# Patient Record
Sex: FEMALE | Race: WHITE | NOT HISPANIC OR LATINO | Employment: UNEMPLOYED | URBAN - METROPOLITAN AREA
[De-identification: names, ages, dates, MRNs, and addresses within clinical notes are randomized per-mention and may not be internally consistent; named-entity substitution may affect disease eponyms.]

---

## 2017-01-31 ENCOUNTER — OFFICE VISIT (OUTPATIENT)
Dept: INTERNAL MEDICINE | Facility: CLINIC | Age: 57
End: 2017-01-31

## 2017-01-31 VITALS
SYSTOLIC BLOOD PRESSURE: 120 MMHG | WEIGHT: 164 LBS | HEIGHT: 69 IN | DIASTOLIC BLOOD PRESSURE: 72 MMHG | BODY MASS INDEX: 24.29 KG/M2 | HEART RATE: 86 BPM | OXYGEN SATURATION: 100 %

## 2017-01-31 DIAGNOSIS — Z01.419 PAP SMEAR, AS PART OF ROUTINE GYNECOLOGICAL EXAMINATION: ICD-10-CM

## 2017-01-31 DIAGNOSIS — Z00.00 ROUTINE ADULT HEALTH MAINTENANCE: Primary | ICD-10-CM

## 2017-01-31 DIAGNOSIS — Z12.11 SCREENING FOR COLON CANCER: ICD-10-CM

## 2017-01-31 DIAGNOSIS — Z12.39 SCREENING FOR BREAST CANCER: ICD-10-CM

## 2017-01-31 LAB
ALBUMIN SERPL-MCNC: 4.7 G/DL (ref 3.2–4.8)
ALBUMIN/GLOB SERPL: 1.6 G/DL (ref 1.5–2.5)
ALP SERPL-CCNC: 45 U/L (ref 25–100)
ALT SERPL W P-5'-P-CCNC: 27 U/L (ref 7–40)
ANION GAP SERPL CALCULATED.3IONS-SCNC: 8 MMOL/L (ref 3–11)
ARTICHOKE IGE QN: 199 MG/DL (ref 0–130)
AST SERPL-CCNC: 29 U/L (ref 0–33)
BASOPHILS # BLD AUTO: 0.07 10*3/MM3 (ref 0–0.2)
BASOPHILS NFR BLD AUTO: 1.1 % (ref 0–1)
BILIRUB BLD-MCNC: NEGATIVE MG/DL
BILIRUB SERPL-MCNC: 0.7 MG/DL (ref 0.3–1.2)
BUN BLD-MCNC: 11 MG/DL (ref 9–23)
BUN/CREAT SERPL: 15.7 (ref 7–25)
CALCIUM SPEC-SCNC: 10 MG/DL (ref 8.7–10.4)
CHLORIDE SERPL-SCNC: 97 MMOL/L (ref 99–109)
CHOLEST SERPL-MCNC: 304 MG/DL (ref 0–200)
CLARITY, POC: CLEAR
CO2 SERPL-SCNC: 32 MMOL/L (ref 20–31)
COLOR UR: YELLOW
CREAT BLD-MCNC: 0.7 MG/DL (ref 0.6–1.3)
DEPRECATED RDW RBC AUTO: 40.2 FL (ref 37–54)
EOSINOPHIL # BLD AUTO: 0.05 10*3/MM3 (ref 0.1–0.3)
EOSINOPHIL NFR BLD AUTO: 0.8 % (ref 0–3)
ERYTHROCYTE [DISTWIDTH] IN BLOOD BY AUTOMATED COUNT: 12.9 % (ref 11.3–14.5)
GFR SERPL CREATININE-BSD FRML MDRD: 87 ML/MIN/1.73
GLOBULIN UR ELPH-MCNC: 3 GM/DL
GLUCOSE BLD-MCNC: 102 MG/DL (ref 70–100)
GLUCOSE UR STRIP-MCNC: NEGATIVE MG/DL
HCT VFR BLD AUTO: 43.7 % (ref 34.5–44)
HDLC SERPL-MCNC: 63 MG/DL (ref 40–60)
HGB BLD-MCNC: 14.7 G/DL (ref 11.5–15.5)
IMM GRANULOCYTES # BLD: 0.02 10*3/MM3 (ref 0–0.03)
IMM GRANULOCYTES NFR BLD: 0.3 % (ref 0–0.6)
KETONES UR QL: NEGATIVE
LEUKOCYTE EST, POC: NEGATIVE
LYMPHOCYTES # BLD AUTO: 1.81 10*3/MM3 (ref 0.6–4.8)
LYMPHOCYTES NFR BLD AUTO: 27.7 % (ref 24–44)
MCH RBC QN AUTO: 28.7 PG (ref 27–31)
MCHC RBC AUTO-ENTMCNC: 33.6 G/DL (ref 32–36)
MCV RBC AUTO: 85.2 FL (ref 80–99)
MONOCYTES # BLD AUTO: 0.57 10*3/MM3 (ref 0–1)
MONOCYTES NFR BLD AUTO: 8.7 % (ref 0–12)
NEUTROPHILS # BLD AUTO: 4.01 10*3/MM3 (ref 1.5–8.3)
NEUTROPHILS NFR BLD AUTO: 61.4 % (ref 41–71)
NITRITE UR-MCNC: NEGATIVE MG/ML
PH UR: 7 [PH] (ref 5–8)
PLATELET # BLD AUTO: 275 10*3/MM3 (ref 150–450)
PMV BLD AUTO: 10.7 FL (ref 6–12)
POTASSIUM BLD-SCNC: 4.1 MMOL/L (ref 3.5–5.5)
PROT SERPL-MCNC: 7.7 G/DL (ref 5.7–8.2)
PROT UR STRIP-MCNC: NEGATIVE MG/DL
RBC # BLD AUTO: 5.13 10*6/MM3 (ref 3.89–5.14)
RBC # UR STRIP: NEGATIVE /UL
SODIUM BLD-SCNC: 137 MMOL/L (ref 132–146)
SP GR UR: 1.01 (ref 1–1.03)
TRIGL SERPL-MCNC: 163 MG/DL (ref 0–150)
TSH SERPL DL<=0.05 MIU/L-ACNC: 1.18 MIU/ML (ref 0.35–5.35)
UROBILINOGEN UR QL: NORMAL
WBC NRBC COR # BLD: 6.53 10*3/MM3 (ref 3.5–10.8)

## 2017-01-31 PROCEDURE — 80050 GENERAL HEALTH PANEL: CPT | Performed by: NURSE PRACTITIONER

## 2017-01-31 PROCEDURE — 81003 URINALYSIS AUTO W/O SCOPE: CPT | Performed by: NURSE PRACTITIONER

## 2017-01-31 PROCEDURE — 99386 PREV VISIT NEW AGE 40-64: CPT | Performed by: NURSE PRACTITIONER

## 2017-01-31 PROCEDURE — 80061 LIPID PANEL: CPT | Performed by: NURSE PRACTITIONER

## 2017-01-31 RX ORDER — TRETINOIN 1 MG/G
CREAM TOPICAL NIGHTLY
COMMUNITY

## 2017-01-31 NOTE — PROGRESS NOTES
Subjective  Establish Care (needing new PCP, needs referral for mammogram )      Ana Hart is a 56 y.o. female.   No Known Allergies  History of Present Illness      Went to eye doctor last week , asked her if smoked or what htn by looking at her arteries    Last pap was 3 yrs ago, mammogram 2 years ago   Never had colonoscopy  Never lipids checked   Would like cpe/pap  The following portions of the patient's history were reviewed and updated as appropriate: allergies, current medications, past family history, past medical history, past social history, past surgical history and problem list.    Review of Systems   Constitutional: Negative for appetite change, fever and unexpected weight change.   HENT: Negative for ear pain, facial swelling and sore throat.    Eyes: Negative for pain and visual disturbance.   Respiratory: Negative for chest tightness, shortness of breath and wheezing.    Cardiovascular: Negative for chest pain and palpitations.   Gastrointestinal: Negative for abdominal pain and blood in stool.   Endocrine: Negative.    Genitourinary: Negative for difficulty urinating and hematuria.   Musculoskeletal: Negative for joint swelling.   Neurological: Negative for tremors, seizures and syncope.   Hematological: Negative for adenopathy.   Psychiatric/Behavioral: Negative.        Objective   Physical Exam   Constitutional: She is oriented to person, place, and time. She appears well-developed and well-nourished. No distress.   HENT:   Head: Normocephalic and atraumatic. Hair is normal.   Right Ear: Hearing, tympanic membrane, external ear and ear canal normal. No drainage. No decreased hearing is noted.   Left Ear: Hearing, tympanic membrane, external ear and ear canal normal. No decreased hearing is noted.   Nose: No nasal deformity.   Mouth/Throat: Oropharynx is clear and moist.   Eyes: Conjunctivae, EOM and lids are normal. Pupils are equal, round, and reactive to light. Lids are everted and swept, no  "foreign bodies found. Right eye exhibits no discharge. Left eye exhibits no discharge.   Fundoscopic exam:       The right eye shows red reflex.        The left eye shows red reflex.   Neck: Normal range of motion. Neck supple. No JVD present. No tracheal deviation present. No thyromegaly present.   Cardiovascular: Normal rate, regular rhythm, normal heart sounds, intact distal pulses and normal pulses.  Exam reveals no gallop and no friction rub.    No murmur heard.  Pulmonary/Chest: Effort normal and breath sounds normal. No respiratory distress. She has no wheezes. She has no rales. She exhibits no tenderness.   Abdominal: Soft. Bowel sounds are normal. She exhibits no distension and no mass. There is no tenderness. There is no rebound and no guarding. No hernia.   Genitourinary: Vagina normal and uterus normal. Rectal exam shows guaiac negative stool. No vaginal discharge found.   Musculoskeletal: Normal range of motion. She exhibits no edema, tenderness or deformity.   Lymphadenopathy:     She has no cervical adenopathy.        Right: No inguinal adenopathy present.        Left: No inguinal adenopathy present.   Neurological: She is alert and oriented to person, place, and time. She has normal reflexes. She displays normal reflexes. No cranial nerve deficit. She exhibits normal muscle tone. Coordination normal.   Skin: Skin is warm and dry. No rash noted. She is not diaphoretic. No erythema.   Psychiatric: She has a normal mood and affect. Her behavior is normal. Judgment and thought content normal.   Nursing note and vitals reviewed.    Visit Vitals   • /72   • Pulse 86   • Ht 69\" (175.3 cm)   • Wt 164 lb (74.4 kg)   • SpO2 100%   • BMI 24.22 kg/m2     , has lived with smokers all her life   Assessment/Plan     Problem List Items Addressed This Visit        Other    Routine adult health maintenance - Primary     Flu shot- does not get those   Never had colonoscopy   SBE- monthly          Relevant Orders "    CBC & AUTO Differential    Comprehensive Metabolic Panel    TSH    Lipid Panel    Ambulatory Referral to Dermatology    POC Urinalysis Dipstick, Automated (Completed)    CBC Auto Differential      Other Visit Diagnoses     Screening for breast cancer        Relevant Orders    Mammo Screening Digital Tomosynthesis Bilateral With CAD    Screening for colon cancer        Relevant Orders    Ambulatory Referral For Screening Colonoscopy    Pap smear, as part of routine gynecological examination            Seatbelts:-yes  Sunscreenyes  Smoke detectors;yes  Self skin exam;yes  Home secure;yes  Healthy diet;yes  Exercise;yes  Smoker;no    Results for orders placed or performed in visit on 01/31/17   POC Urinalysis Dipstick, Automated   Result Value Ref Range    Color Yellow Yellow, Straw, Dark Yellow, Alejandra    Clarity, UA Clear Clear    Glucose, UA Negative Negative, 1000 mg/dL (3+) mg/dL    Bilirubin Negative Negative    Ketones, UA Negative Negative    Specific Gravity  1.010 1.005 - 1.030    Blood, UA Negative Negative    pH, Urine 7.0 5.0 - 8.0    Protein, POC Negative Negative mg/dL    Urobilinogen, UA Normal Normal    Leukocytes Negative Negative    Nitrite, UA Negative Negative     I will review labs and send to pt with results and poc

## 2017-02-01 ENCOUNTER — TELEPHONE (OUTPATIENT)
Dept: INTERNAL MEDICINE | Facility: CLINIC | Age: 57
End: 2017-02-01

## 2017-02-01 RX ORDER — ATORVASTATIN CALCIUM 20 MG/1
20 TABLET, FILM COATED ORAL DAILY
Qty: 30 TABLET | Refills: 11 | Status: SHIPPED | OUTPATIENT
Start: 2017-02-01 | End: 2019-01-03

## 2017-02-01 NOTE — TELEPHONE ENCOUNTER
----- Message from Monica Hernandez sent at 2/1/2017 10:26 AM EST -----  Contact: PATIENT   RE: NEW RX    PATIENT RECEIVED CALL FROM HER PHARMACY TO TELL SHE HAD A PRESCRIPTION READY TO BE PICKED AND SHE IS UNAWARE OF ANY NEW MEDICATION. SHE WOULD LIKE TO SPEAK WITH YOU ABOUT THIS.    [facility] [phone]  [place] [phone]

## 2017-02-01 NOTE — TELEPHONE ENCOUNTER
Called and informed pt that Milly had sent in some cholesterol medication due to her levels coming back high and informed pt of Milly's recommendations. Pt verbalized understanding and had no further questions.

## 2017-02-17 ENCOUNTER — HOSPITAL ENCOUNTER (OUTPATIENT)
Dept: MAMMOGRAPHY | Facility: HOSPITAL | Age: 57
Discharge: HOME | End: 2017-02-17
Admitting: NURSE PRACTITIONER

## 2017-02-17 DIAGNOSIS — Z12.39 SCREENING FOR BREAST CANCER: ICD-10-CM

## 2017-02-17 PROCEDURE — 77063 BREAST TOMOSYNTHESIS BI: CPT | Performed by: RADIOLOGY

## 2017-02-17 PROCEDURE — 77067 SCR MAMMO BI INCL CAD: CPT | Performed by: RADIOLOGY

## 2017-02-17 PROCEDURE — G0202 SCR MAMMO BI INCL CAD: HCPCS

## 2017-02-17 PROCEDURE — 77063 BREAST TOMOSYNTHESIS BI: CPT

## 2017-05-04 DIAGNOSIS — E78.5 DYSLIPIDEMIA: Primary | ICD-10-CM

## 2017-05-04 LAB
ARTICHOKE IGE QN: 64 MG/DL (ref 0–130)
CHOLEST SERPL-MCNC: 128 MG/DL (ref 0–200)
HDLC SERPL-MCNC: 54 MG/DL (ref 40–60)
TRIGL SERPL-MCNC: 61 MG/DL (ref 0–150)

## 2017-05-04 PROCEDURE — 80061 LIPID PANEL: CPT | Performed by: NURSE PRACTITIONER

## 2019-01-03 ENCOUNTER — RESULTS ENCOUNTER (OUTPATIENT)
Dept: INTERNAL MEDICINE | Facility: CLINIC | Age: 59
End: 2019-01-03

## 2019-01-03 ENCOUNTER — OFFICE VISIT (OUTPATIENT)
Dept: INTERNAL MEDICINE | Facility: CLINIC | Age: 59
End: 2019-01-03

## 2019-01-03 VITALS
WEIGHT: 165.6 LBS | SYSTOLIC BLOOD PRESSURE: 104 MMHG | HEIGHT: 69 IN | TEMPERATURE: 98.2 F | OXYGEN SATURATION: 98 % | DIASTOLIC BLOOD PRESSURE: 66 MMHG | HEART RATE: 94 BPM | BODY MASS INDEX: 24.53 KG/M2

## 2019-01-03 DIAGNOSIS — Z12.4 SCREENING FOR MALIGNANT NEOPLASM OF CERVIX: ICD-10-CM

## 2019-01-03 DIAGNOSIS — Z12.39 SCREENING FOR MALIGNANT NEOPLASM OF BREAST: ICD-10-CM

## 2019-01-03 DIAGNOSIS — Z11.59 ENCOUNTER FOR HEPATITIS C SCREENING TEST FOR LOW RISK PATIENT: ICD-10-CM

## 2019-01-03 DIAGNOSIS — Z00.00 HEALTHCARE MAINTENANCE: ICD-10-CM

## 2019-01-03 DIAGNOSIS — Z12.11 SCREENING FOR MALIGNANT NEOPLASM OF COLON: Primary | ICD-10-CM

## 2019-01-03 DIAGNOSIS — Z12.11 SCREENING FOR MALIGNANT NEOPLASM OF COLON: ICD-10-CM

## 2019-01-03 LAB
ALBUMIN SERPL-MCNC: 4.52 G/DL (ref 3.2–4.8)
ALBUMIN/GLOB SERPL: 2.1 G/DL (ref 1.5–2.5)
ALP SERPL-CCNC: 49 U/L (ref 25–100)
ALT SERPL W P-5'-P-CCNC: 28 U/L (ref 7–40)
ANION GAP SERPL CALCULATED.3IONS-SCNC: 6 MMOL/L (ref 3–11)
ARTICHOKE IGE QN: 210 MG/DL (ref 0–130)
AST SERPL-CCNC: 25 U/L (ref 0–33)
BILIRUB SERPL-MCNC: 0.8 MG/DL (ref 0.3–1.2)
BUN BLD-MCNC: 12 MG/DL (ref 9–23)
BUN/CREAT SERPL: 15.2 (ref 7–25)
CALCIUM SPEC-SCNC: 9.3 MG/DL (ref 8.7–10.4)
CHLORIDE SERPL-SCNC: 100 MMOL/L (ref 99–109)
CHOLEST SERPL-MCNC: 271 MG/DL (ref 0–200)
CO2 SERPL-SCNC: 32 MMOL/L (ref 20–31)
CREAT BLD-MCNC: 0.79 MG/DL (ref 0.6–1.3)
GFR SERPL CREATININE-BSD FRML MDRD: 75 ML/MIN/1.73
GLOBULIN UR ELPH-MCNC: 2.2 GM/DL
GLUCOSE BLD-MCNC: 94 MG/DL (ref 70–100)
HCV AB SER DONR QL: NORMAL
HDLC SERPL-MCNC: 59 MG/DL (ref 40–60)
POTASSIUM BLD-SCNC: 4.1 MMOL/L (ref 3.5–5.5)
PROT SERPL-MCNC: 6.7 G/DL (ref 5.7–8.2)
SODIUM BLD-SCNC: 138 MMOL/L (ref 132–146)
TRIGL SERPL-MCNC: 125 MG/DL (ref 0–150)

## 2019-01-03 PROCEDURE — 90471 IMMUNIZATION ADMIN: CPT | Performed by: NURSE PRACTITIONER

## 2019-01-03 PROCEDURE — 86803 HEPATITIS C AB TEST: CPT | Performed by: NURSE PRACTITIONER

## 2019-01-03 PROCEDURE — 90674 CCIIV4 VAC NO PRSV 0.5 ML IM: CPT | Performed by: NURSE PRACTITIONER

## 2019-01-03 PROCEDURE — 99396 PREV VISIT EST AGE 40-64: CPT | Performed by: NURSE PRACTITIONER

## 2019-01-03 PROCEDURE — 80053 COMPREHEN METABOLIC PANEL: CPT | Performed by: NURSE PRACTITIONER

## 2019-01-03 PROCEDURE — 80061 LIPID PANEL: CPT | Performed by: NURSE PRACTITIONER

## 2019-01-03 RX ORDER — ATORVASTATIN CALCIUM 40 MG/1
40 TABLET, FILM COATED ORAL DAILY
Qty: 30 TABLET | Refills: 1 | Status: SHIPPED | OUTPATIENT
Start: 2019-01-03 | End: 2019-02-15 | Stop reason: SDUPTHER

## 2019-01-03 NOTE — PROGRESS NOTES
"Sebas Hart is a 58 y.o. female.   Chief Complaint   Patient presents with   • Annual Exam      History of Present Illness Needs pap  No periods.  No hysterectomy.  States she feels well.Patient denies fever chills, headache, ear pain, sore throat, shortness of air, cough, chest pain, abdominal pain, nausea, vomiting, diarrhea, dysuria, blood in stool or urine. Mood is good.  PHQ-2 Depression Screen negative  Eating and drinking as usual.  No unexplained weight loss or gain.    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    Current Outpatient Medications:   •  Multiple Vitamins-Minerals (CENTRUM SILVER ULTRA WOMENS PO), Take  by mouth., Disp: , Rfl:   •  tretinoin (RETIN-A) 0.1 % cream, Apply  topically Every Night., Disp: , Rfl:     Review of Systems Consitutional, HEENT, Respiratory, CV, GI, , Skin, Musculoskeletal, Neuro-mental, Endocrinological, Hematological were reviewed.  Positives were discussed in the HPI, otherwise ROS was negative   /66   Pulse 94   Temp 98.2 °F (36.8 °C)   Ht 175.3 cm (69\")   Wt 75.1 kg (165 lb 9.6 oz)   SpO2 98%   BMI 24.45 kg/m²     Objective   No Known Allergies    Physical Exam   Constitutional: She is oriented to person, place, and time. She appears well-developed and well-nourished. No distress.   HENT:   Head: Normocephalic.   Right Ear: External ear normal.   Left Ear: External ear normal.   Mouth/Throat: Oropharynx is clear and moist.   TMs are clear   Eyes: Pupils are equal, round, and reactive to light. Right eye exhibits no discharge. Left eye exhibits no discharge. No scleral icterus.   Neck: Neck supple. No thyromegaly present.   No carotid bruit bilat   Cardiovascular: Normal rate, regular " rhythm, normal heart sounds and intact distal pulses. Exam reveals no gallop and no friction rub.   No murmur heard.  Pulmonary/Chest: Effort normal and breath sounds normal. No stridor. No respiratory distress. She has no wheezes. She has no rales.   Rest normal female, no mass dimpling or discharge or nodes   Abdominal: Soft. Bowel sounds are normal. She exhibits no distension and no mass. There is no tenderness. There is no rebound and no guarding.   Genitourinary: Vagina normal and uterus normal.   Genitourinary Comments: Uterus, adnexa, ovaries nontender nonenlarged   Musculoskeletal:   MOTA well.  Gait upright and steady    Lymphadenopathy:     She has no cervical adenopathy.   Neurological: She is alert and oriented to person, place, and time.   Skin: Skin is warm and dry. Capillary refill takes less than 2 seconds.   Is pink, no rash    Psychiatric: She has a normal mood and affect. Her behavior is normal. Judgment and thought content normal.   Nursing note and vitals reviewed.      Procedures    LABS  Results for orders placed or performed in visit on 01/03/19   Comprehensive Metabolic Panel   Result Value Ref Range    Glucose 94 70 - 100 mg/dL    BUN 12 9 - 23 mg/dL    Creatinine 0.79 0.60 - 1.30 mg/dL    Sodium 138 132 - 146 mmol/L    Potassium 4.1 3.5 - 5.5 mmol/L    Chloride 100 99 - 109 mmol/L    CO2 32.0 (H) 20.0 - 31.0 mmol/L    Calcium 9.3 8.7 - 10.4 mg/dL    Total Protein 6.7 5.7 - 8.2 g/dL    Albumin 4.52 3.20 - 4.80 g/dL    ALT (SGPT) 28 7 - 40 U/L    AST (SGOT) 25 0 - 33 U/L    Alkaline Phosphatase 49 25 - 100 U/L    Total Bilirubin 0.8 0.3 - 1.2 mg/dL    eGFR Non African Amer 75 >60 mL/min/1.73    Globulin 2.2 gm/dL    A/G Ratio 2.1 1.5 - 2.5 g/dL    BUN/Creatinine Ratio 15.2 7.0 - 25.0    Anion Gap 6.0 3.0 - 11.0 mmol/L   Lipid Panel   Result Value Ref Range    Total Cholesterol 271 (H) 0 - 200 mg/dL    Triglycerides 125 0 - 150 mg/dL    HDL Cholesterol 59 40 - 60 mg/dL    LDL Cholesterol  210  "(H) 0 - 130 mg/dL   Hepatitis C antibody   Result Value Ref Range    Hepatitis C Ab Non-Reactive Non-Reactive       Assessment/Plan   Ana was seen today for annual exam.    Diagnoses and all orders for this visit:    Screening for malignant neoplasm of colon  -     Cologuard - Stool, Per Rectum; Future    Screening for malignant neoplasm of breast  -     Mammo screening digital tomosynthesis bilateral w CAD; Future    Healthcare maintenance  -     Comprehensive Metabolic Panel  -     Lipid Panel    Encounter for hepatitis C screening test for low risk patient  -     Hepatitis C antibody    Screening for malignant neoplasm of cervix  -     Liquid-based Pap Smear, Screening; Future    Other orders  -     Flucelvax Quad=>4Years ([id number])      Patient Instructions   Labs as discussed.  Mammogram as discussed.  [place] guard screening as discussed.  Health Education:  Heart healthy diet to include fresh fruits and vegetables.  Limit intake of red meats.  Increase chicken and fish in diet.  Avoid fried greasy foods.  Daily activity working up 30 minutes of brisk exercise daily.  Adequate sleep and \"down time\".  Pt verbalizes understanding and agreement with plan of care.       EMR Dragon/transcription disclaimer:  Please note that portions of this note were completed with a voice recognition program.  Electronic transcription of the voice recognition program may permit erroneous words or phrases to be inadvertently transcribed.  Although I have reviewed the note for such errors, some may still exist in this documentation   JUAN Penn   "

## 2019-01-03 NOTE — PATIENT INSTRUCTIONS
"Labs as discussed.  Mammogram as discussed.  [place] guard screening as discussed.  Health Education:  Heart healthy diet to include fresh fruits and vegetables.  Limit intake of red meats.  Increase chicken and fish in diet.  Avoid fried greasy foods.  Daily activity working up 30 minutes of brisk exercise daily.  Adequate sleep and \"down time\".  Pt verbalizes understanding and agreement with plan of care.   "

## 2019-01-09 ENCOUNTER — TELEPHONE (OUTPATIENT)
Dept: INTERNAL MEDICINE | Facility: CLINIC | Age: 59
End: 2019-01-09

## 2019-02-15 ENCOUNTER — OFFICE VISIT (OUTPATIENT)
Dept: INTERNAL MEDICINE | Facility: CLINIC | Age: 59
End: 2019-02-15

## 2019-02-15 ENCOUNTER — HOSPITAL ENCOUNTER (OUTPATIENT)
Dept: MAMMOGRAPHY | Facility: HOSPITAL | Age: 59
Discharge: HOME | End: 2019-02-15
Admitting: NURSE PRACTITIONER

## 2019-02-15 VITALS
BODY MASS INDEX: 23.99 KG/M2 | WEIGHT: 162 LBS | HEART RATE: 79 BPM | SYSTOLIC BLOOD PRESSURE: 112 MMHG | DIASTOLIC BLOOD PRESSURE: 60 MMHG | OXYGEN SATURATION: 99 % | HEIGHT: 69 IN

## 2019-02-15 DIAGNOSIS — Z12.39 SCREENING FOR MALIGNANT NEOPLASM OF BREAST: ICD-10-CM

## 2019-02-15 DIAGNOSIS — E78.5 HYPERLIPIDEMIA, UNSPECIFIED HYPERLIPIDEMIA TYPE: Primary | ICD-10-CM

## 2019-02-15 LAB
ALBUMIN SERPL-MCNC: 4.63 G/DL (ref 3.2–4.8)
ALBUMIN/GLOB SERPL: 2.4 G/DL (ref 1.5–2.5)
ALP SERPL-CCNC: 44 U/L (ref 25–100)
ALT SERPL W P-5'-P-CCNC: 32 U/L (ref 7–40)
ANION GAP SERPL CALCULATED.3IONS-SCNC: 4 MMOL/L (ref 3–11)
AST SERPL-CCNC: 28 U/L (ref 0–33)
BILIRUB SERPL-MCNC: 0.8 MG/DL (ref 0.3–1.2)
BUN BLD-MCNC: 12 MG/DL (ref 9–23)
BUN/CREAT SERPL: 16.2 (ref 7–25)
CALCIUM SPEC-SCNC: 9.8 MG/DL (ref 8.7–10.4)
CHLORIDE SERPL-SCNC: 105 MMOL/L (ref 99–109)
CO2 SERPL-SCNC: 32 MMOL/L (ref 20–31)
CREAT BLD-MCNC: 0.74 MG/DL (ref 0.6–1.3)
GFR SERPL CREATININE-BSD FRML MDRD: 81 ML/MIN/1.73
GLOBULIN UR ELPH-MCNC: 2 GM/DL
GLUCOSE BLD-MCNC: 91 MG/DL (ref 70–100)
POTASSIUM BLD-SCNC: 4.3 MMOL/L (ref 3.5–5.5)
PROT SERPL-MCNC: 6.6 G/DL (ref 5.7–8.2)
SODIUM BLD-SCNC: 141 MMOL/L (ref 132–146)

## 2019-02-15 PROCEDURE — 80053 COMPREHEN METABOLIC PANEL: CPT | Performed by: NURSE PRACTITIONER

## 2019-02-15 PROCEDURE — 99213 OFFICE O/P EST LOW 20 MIN: CPT | Performed by: NURSE PRACTITIONER

## 2019-02-15 PROCEDURE — 77067 SCR MAMMO BI INCL CAD: CPT

## 2019-02-15 PROCEDURE — 77063 BREAST TOMOSYNTHESIS BI: CPT

## 2019-02-15 PROCEDURE — 77067 SCR MAMMO BI INCL CAD: CPT | Performed by: RADIOLOGY

## 2019-02-15 PROCEDURE — 77063 BREAST TOMOSYNTHESIS BI: CPT | Performed by: RADIOLOGY

## 2019-02-15 RX ORDER — ATORVASTATIN CALCIUM 40 MG/1
40 TABLET, FILM COATED ORAL DAILY
Qty: 90 TABLET | Refills: 1 | Status: SHIPPED | OUTPATIENT
Start: 2019-02-15 | End: 2019-07-15 | Stop reason: SDUPTHER

## 2019-02-15 NOTE — PATIENT INSTRUCTIONS
Labs as discussed.  Stay on a heart healthy low-fat low-cholesterol diet.  Exercises daily.  Begin Benefiber and omega-3 Fish oil in addition to the Lipitor.  We will repeat your lipid panel in 6 months.  Pt verbalizes understanding and agreement with plan of care.

## 2019-02-15 NOTE — PROGRESS NOTES
"Sebas Hart is a 58 y.o. female.   Chief Complaint   Patient presents with   • Hyperlipidemia      History of Present Illness  As above  Is tolerating Lipitor without problems.  Has Made dietary modification.  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  Patient denies fever chills, headache, ear pain, sore throat, shortness of air, cough, wheezing,  chest pain, abdominal pain, nausea, vomiting, diarrhea, dysuria, blood in stool or urine.  Mood is good.  Eating and drinking as usual.  No unexplained weight loss or gain.       The following portions of the patient's history were reviewed and updated as appropriate: allergies, current medications, past family history, past medical history, past social history, past surgical history and problem list.    Current Outpatient Medications:   •  atorvastatin (LIPITOR) 40 MG tablet, Take 1 tablet by mouth Daily., Disp: 90 tablet, Rfl: 1  •  Multiple Vitamins-Minerals (CENTRUM SILVER ULTRA WOMENS PO), Take  by mouth., Disp: , Rfl:   •  tretinoin (RETIN-A) 0.1 % cream, Apply  topically Every Night., Disp: , Rfl:     Review of Systems Consitutional, HEENT, Respiratory, CV, GI, , Skin, Musculoskeletal, Neuro-mental, Endocrinological, Hematological were reviewed.  Positives were discussed in the HPI, otherwise ROS was negative   /60   Pulse 79   Ht 175.3 cm (69\")   Wt 73.5 kg (162 lb)   SpO2 99%   BMI 23.92 kg/m²     Objective   No Known Allergies    Physical Exam   Constitutional: She is oriented to person, place, and time. She appears well-developed and well-nourished. No distress.   HENT:   Head: Normocephalic.   Eyes: Right eye exhibits no discharge. Left eye exhibits no discharge. No scleral icterus.   Neck: Neck supple.   Cardiovascular: Normal rate, regular rhythm, normal heart sounds and intact distal pulses. Exam reveals no gallop and no friction rub.   No murmur heard.  Pulmonary/Chest: Effort normal and breath sounds normal. No " stridor. No respiratory distress. She has no wheezes. She has no rales.   Abdominal: Soft. She exhibits no mass. There is no tenderness.   Musculoskeletal:   MOTA well.  Gait upright and steady    Neurological: She is alert and oriented to person, place, and time.   Skin: Skin is warm and dry. Capillary refill takes less than 2 seconds.   Is pink, no rash    Nursing note and vitals reviewed.      Procedures    LABS  Results for orders placed or performed in visit on 01/03/19   Comprehensive Metabolic Panel   Result Value Ref Range    Glucose 94 70 - 100 mg/dL    BUN 12 9 - 23 mg/dL    Creatinine 0.79 0.60 - 1.30 mg/dL    Sodium 138 132 - 146 mmol/L    Potassium 4.1 3.5 - 5.5 mmol/L    Chloride 100 99 - 109 mmol/L    CO2 32.0 (H) 20.0 - 31.0 mmol/L    Calcium 9.3 8.7 - 10.4 mg/dL    Total Protein 6.7 5.7 - 8.2 g/dL    Albumin 4.52 3.20 - 4.80 g/dL    ALT (SGPT) 28 7 - 40 U/L    AST (SGOT) 25 0 - 33 U/L    Alkaline Phosphatase 49 25 - 100 U/L    Total Bilirubin 0.8 0.3 - 1.2 mg/dL    eGFR Non African Amer 75 >60 mL/min/1.73    Globulin 2.2 gm/dL    A/G Ratio 2.1 1.5 - 2.5 g/dL    BUN/Creatinine Ratio 15.2 7.0 - 25.0    Anion Gap 6.0 3.0 - 11.0 mmol/L   Lipid Panel   Result Value Ref Range    Total Cholesterol 271 (H) 0 - 200 mg/dL    Triglycerides 125 0 - 150 mg/dL    HDL Cholesterol 59 40 - 60 mg/dL    LDL Cholesterol  210 (H) 0 - 130 mg/dL   Hepatitis C antibody   Result Value Ref Range    Hepatitis C Ab Non-Reactive Non-Reactive       Assessment/Plan   Ana was seen today for hyperlipidemia.    Diagnoses and all orders for this visit:    Hyperlipidemia, unspecified hyperlipidemia type  -     atorvastatin (LIPITOR) 40 MG tablet; Take 1 tablet by mouth Daily.  -     Cancel: Lipid Panel  -     Comprehensive Metabolic Panel      Patient Instructions   Labs as discussed.  Stay on a heart healthy low-fat low-cholesterol diet.  Exercises daily.  Begin Benefiber and omega-3 Fish oil in addition to the Lipitor.  We will  repeat your lipid panel in 6 months.  Pt verbalizes understanding and agreement with plan of care.           Antonina Braun APRN

## 2019-03-07 ENCOUNTER — TELEPHONE (OUTPATIENT)
Dept: INTERNAL MEDICINE | Facility: CLINIC | Age: 59
End: 2019-03-07

## 2019-07-15 ENCOUNTER — OFFICE VISIT (OUTPATIENT)
Dept: INTERNAL MEDICINE | Facility: CLINIC | Age: 59
End: 2019-07-15

## 2019-07-15 VITALS
OXYGEN SATURATION: 95 % | SYSTOLIC BLOOD PRESSURE: 114 MMHG | RESPIRATION RATE: 16 BRPM | WEIGHT: 166 LBS | HEIGHT: 69 IN | HEART RATE: 78 BPM | DIASTOLIC BLOOD PRESSURE: 72 MMHG | TEMPERATURE: 98.4 F | BODY MASS INDEX: 24.59 KG/M2

## 2019-07-15 DIAGNOSIS — E78.5 HYPERLIPIDEMIA, UNSPECIFIED HYPERLIPIDEMIA TYPE: ICD-10-CM

## 2019-07-15 LAB
ALBUMIN SERPL-MCNC: 4.6 G/DL (ref 3.5–5.2)
ALBUMIN/GLOB SERPL: 1.6 G/DL
ALP SERPL-CCNC: 52 U/L (ref 39–117)
ALT SERPL W P-5'-P-CCNC: 35 U/L (ref 1–33)
ANION GAP SERPL CALCULATED.3IONS-SCNC: 10.9 MMOL/L (ref 5–15)
AST SERPL-CCNC: 27 U/L (ref 1–32)
BILIRUB SERPL-MCNC: 0.5 MG/DL (ref 0.2–1.2)
BUN BLD-MCNC: 11 MG/DL (ref 6–20)
BUN/CREAT SERPL: 14.5 (ref 7–25)
CALCIUM SPEC-SCNC: 9.9 MG/DL (ref 8.6–10.5)
CHLORIDE SERPL-SCNC: 97 MMOL/L (ref 98–107)
CHOLEST SERPL-MCNC: 161 MG/DL (ref 0–200)
CK SERPL-CCNC: 109 U/L (ref 20–180)
CO2 SERPL-SCNC: 29.1 MMOL/L (ref 22–29)
CREAT BLD-MCNC: 0.76 MG/DL (ref 0.57–1)
GFR SERPL CREATININE-BSD FRML MDRD: 78 ML/MIN/1.73
GLOBULIN UR ELPH-MCNC: 2.9 GM/DL
GLUCOSE BLD-MCNC: 86 MG/DL (ref 65–99)
HDLC SERPL-MCNC: 57 MG/DL (ref 40–60)
LDLC SERPL CALC-MCNC: 76 MG/DL (ref 0–100)
LDLC/HDLC SERPL: 1.33 {RATIO}
POTASSIUM BLD-SCNC: 4.6 MMOL/L (ref 3.5–5.2)
PROT SERPL-MCNC: 7.5 G/DL (ref 6–8.5)
SODIUM BLD-SCNC: 137 MMOL/L (ref 136–145)
TRIGL SERPL-MCNC: 140 MG/DL (ref 0–150)
VLDLC SERPL-MCNC: 28 MG/DL (ref 5–40)

## 2019-07-15 PROCEDURE — 80053 COMPREHEN METABOLIC PANEL: CPT | Performed by: NURSE PRACTITIONER

## 2019-07-15 PROCEDURE — 99213 OFFICE O/P EST LOW 20 MIN: CPT | Performed by: NURSE PRACTITIONER

## 2019-07-15 PROCEDURE — 80061 LIPID PANEL: CPT | Performed by: NURSE PRACTITIONER

## 2019-07-15 PROCEDURE — 82550 ASSAY OF CK (CPK): CPT | Performed by: NURSE PRACTITIONER

## 2019-07-15 RX ORDER — ATORVASTATIN CALCIUM 40 MG/1
40 TABLET, FILM COATED ORAL DAILY
Qty: 90 TABLET | Refills: 1 | Status: SHIPPED | OUTPATIENT
Start: 2019-07-15 | End: 2020-04-02

## 2019-07-15 NOTE — PROGRESS NOTES
"Sebas Hart is a 58 y.o. female.   Chief Complaint   Patient presents with   • Hyperlipidemia     Follow Up      History of Present Illness Feeling great! No problems with Lipitor.  Patient denies fever chills, headache, ear pain, sore throat, shortness of air, cough, wheezing, chest pain, abdominal pain, nausea, vomiting, diarrhea, dysuria, blood in stool or urine.  Mood is good.  Eating and drinking as usual.  No unexplained weight loss or gain.      The following portions of the patient's history were reviewed and updated as appropriate: allergies, current medications, past family history, past medical history, past social history, past surgical history and problem list.    Current Outpatient Medications:   •  atorvastatin (LIPITOR) 40 MG tablet, Take 1 tablet by mouth Daily., Disp: 90 tablet, Rfl: 1  •  Multiple Vitamins-Minerals (CENTRUM SILVER ULTRA WOMENS PO), Take  by mouth., Disp: , Rfl:   •  tretinoin (RETIN-A) 0.1 % cream, Apply  topically Every Night., Disp: , Rfl:     Review of Systems Consitutional, HEENT, Respiratory, CV, GI, , Skin, Musculoskeletal, Neuro-mental, Endocrinological, Hematological were reviewed.  Positives were discussed in the HPI, otherwise ROS was negative   /72   Pulse 78   Temp 98.4 °F (36.9 °C)   Resp 16   Ht 175.3 cm (69\")   Wt 75.3 kg (166 lb)   SpO2 95%   Breastfeeding? No   BMI 24.51 kg/m²     Objective   No Known Allergies    Physical Exam   Constitutional: She is oriented to person, place, and time. She appears well-developed and well-nourished. No distress.   HENT:   Head: Normocephalic.   Eyes: Right eye exhibits no discharge. Left eye exhibits no discharge. No scleral icterus.   Neck: Neck supple. No thyromegaly present.   Cardiovascular: Normal rate, regular rhythm, normal heart sounds and intact distal pulses. Exam reveals no gallop and no friction rub.   No murmur heard.  Pulmonary/Chest: Effort normal and breath sounds normal. No stridor. No " respiratory distress. She has no wheezes. She has no rales.   Abdominal: Soft. Bowel sounds are normal. She exhibits no mass. There is no tenderness.   Musculoskeletal:   MOTA well.  Gait upright and steady    Lymphadenopathy:     She has no cervical adenopathy.   Neurological: She is alert and oriented to person, place, and time.   Skin: Skin is warm and dry. Capillary refill takes less than 2 seconds.   Is pink, no rash    Nursing note and vitals reviewed.      Procedures    LABS  Results for orders placed or performed in visit on 07/15/19   Comprehensive Metabolic Panel   Result Value Ref Range    Glucose 86 65 - 99 mg/dL    BUN 11 6 - 20 mg/dL    Creatinine 0.76 0.57 - 1.00 mg/dL    Sodium 137 136 - 145 mmol/L    Potassium 4.6 3.5 - 5.2 mmol/L    Chloride 97 (L) 98 - 107 mmol/L    CO2 29.1 (H) 22.0 - 29.0 mmol/L    Calcium 9.9 8.6 - 10.5 mg/dL    Total Protein 7.5 6.0 - 8.5 g/dL    Albumin 4.60 3.50 - 5.20 g/dL    ALT (SGPT) 35 (H) 1 - 33 U/L    AST (SGOT) 27 1 - 32 U/L    Alkaline Phosphatase 52 39 - 117 U/L    Total Bilirubin 0.5 0.2 - 1.2 mg/dL    eGFR Non African Amer 78 >60 mL/min/1.73    Globulin 2.9 gm/dL    A/G Ratio 1.6 g/dL    BUN/Creatinine Ratio 14.5 7.0 - 25.0    Anion Gap 10.9 5.0 - 15.0 mmol/L   CK   Result Value Ref Range    Creatine Kinase 109 20 - 180 U/L   Lipid Panel   Result Value Ref Range    Total Cholesterol 161 0 - 200 mg/dL    Triglycerides 140 0 - 150 mg/dL    HDL Cholesterol 57 40 - 60 mg/dL    LDL Cholesterol  76 0 - 100 mg/dL    VLDL Cholesterol 28 5 - 40 mg/dL    LDL/HDL Ratio 1.33        Assessment/Plan   Ana was seen today for hyperlipidemia.    Diagnoses and all orders for this visit:    Hyperlipidemia, unspecified hyperlipidemia type  -     atorvastatin (LIPITOR) 40 MG tablet; Take 1 tablet by mouth Daily.  -     Comprehensive Metabolic Panel  -     CK  -     Lipid Panel      Patient Instructions   Continue to stay busy.  Continue  a low-fat low-cholesterol diet.  I did not  change any medications today.Pt verbalizes understanding and agreement with plan of care.       EMR Dragon/transcription disclaimer:  Please note that portions of this note were completed with a voice recognition program.  Electronic transcription of the voice recognition program may permit erroneous words or phrases to be inadvertently transcribed.  Although I have reviewed the note for such errors, some may still exist in this documentation     Antonina Braun, APRN

## 2019-07-17 PROBLEM — E78.2 MIXED HYPERLIPIDEMIA: Status: ACTIVE | Noted: 2019-07-17

## 2019-07-17 NOTE — PATIENT INSTRUCTIONS
Continue to stay busy.  Continue  a low-fat low-cholesterol diet.  I did not change any medications today.Pt verbalizes understanding and agreement with plan of care.

## 2020-02-27 ENCOUNTER — TRANSCRIBE ORDERS (OUTPATIENT)
Dept: ADMINISTRATIVE | Facility: HOSPITAL | Age: 60
End: 2020-02-27

## 2020-02-27 ENCOUNTER — TELEPHONE (OUTPATIENT)
Dept: INTERNAL MEDICINE | Facility: CLINIC | Age: 60
End: 2020-02-27

## 2020-02-27 DIAGNOSIS — Z12.31 VISIT FOR SCREENING MAMMOGRAM: Primary | ICD-10-CM

## 2020-02-27 NOTE — TELEPHONE ENCOUNTER
PATIENT CALLED AND SAID SHE CALLED TO TRY AND SCHEDULE HER MAMMOGRAM THIS MORNING BUT WAS UNABLE TO BECAUSE SHE DOES NOT HAVE A PCP. SHE HAS BEEN A PATIENT AT THE OFFICE FOR AWHILE AND REMEMBERS SEEING SHAQUILLE DAMON. IN ORDER FOR TO SCHEDULE HER MAMMOGRAM SHE NEEDS A PLACE FOR THE RESULTS TO BE SENT TO. PLEASE CALL PATIENT ONCE THIS MESSAGE HAS BEEN RECEIVED AND ADVISED.     PATIENT CALL BACK [phone]

## 2020-02-27 NOTE — TELEPHONE ENCOUNTER
Let pt know she needs to make est care appointment, pt states she will call back to have that done. She would like to establish with Darius

## 2020-04-02 DIAGNOSIS — E78.5 HYPERLIPIDEMIA, UNSPECIFIED HYPERLIPIDEMIA TYPE: ICD-10-CM

## 2020-04-02 RX ORDER — ATORVASTATIN CALCIUM 40 MG/1
TABLET, FILM COATED ORAL
Qty: 90 TABLET | Refills: 0 | Status: SHIPPED | OUTPATIENT
Start: 2020-04-02 | End: 2023-03-29

## 2021-02-08 ENCOUNTER — TRANSCRIBE ORDERS (OUTPATIENT)
Dept: ADMINISTRATIVE | Facility: HOSPITAL | Age: 61
End: 2021-02-08

## 2021-02-08 DIAGNOSIS — Z12.31 VISIT FOR SCREENING MAMMOGRAM: Primary | ICD-10-CM

## 2021-02-24 ENCOUNTER — HOSPITAL ENCOUNTER (OUTPATIENT)
Dept: MAMMOGRAPHY | Facility: HOSPITAL | Age: 61
Discharge: HOME | End: 2021-02-24
Admitting: NURSE PRACTITIONER

## 2021-02-24 DIAGNOSIS — Z12.31 VISIT FOR SCREENING MAMMOGRAM: ICD-10-CM

## 2021-02-24 PROCEDURE — 77067 SCR MAMMO BI INCL CAD: CPT | Performed by: RADIOLOGY

## 2021-02-24 PROCEDURE — 77067 SCR MAMMO BI INCL CAD: CPT

## 2021-02-24 PROCEDURE — 77063 BREAST TOMOSYNTHESIS BI: CPT

## 2021-02-24 PROCEDURE — 77063 BREAST TOMOSYNTHESIS BI: CPT | Performed by: RADIOLOGY

## 2022-04-20 ENCOUNTER — TRANSCRIBE ORDERS (OUTPATIENT)
Dept: ADMINISTRATIVE | Facility: HOSPITAL | Age: 62
End: 2022-04-20

## 2022-04-20 DIAGNOSIS — Z12.31 VISIT FOR SCREENING MAMMOGRAM: Primary | ICD-10-CM

## 2022-05-11 ENCOUNTER — HOSPITAL ENCOUNTER (OUTPATIENT)
Dept: MAMMOGRAPHY | Facility: HOSPITAL | Age: 62
Discharge: HOME | End: 2022-05-11
Admitting: FAMILY MEDICINE

## 2022-05-11 DIAGNOSIS — Z12.31 VISIT FOR SCREENING MAMMOGRAM: ICD-10-CM

## 2022-05-11 PROCEDURE — 77063 BREAST TOMOSYNTHESIS BI: CPT | Performed by: RADIOLOGY

## 2022-05-11 PROCEDURE — 77063 BREAST TOMOSYNTHESIS BI: CPT

## 2022-05-11 PROCEDURE — 77067 SCR MAMMO BI INCL CAD: CPT | Performed by: RADIOLOGY

## 2022-05-11 PROCEDURE — 77067 SCR MAMMO BI INCL CAD: CPT

## 2023-03-29 ENCOUNTER — OFFICE VISIT (OUTPATIENT)
Dept: INTERNAL MEDICINE | Facility: CLINIC | Age: 63
End: 2023-03-29
Payer: MEDICAID

## 2023-03-29 VITALS
DIASTOLIC BLOOD PRESSURE: 82 MMHG | HEIGHT: 68 IN | OXYGEN SATURATION: 98 % | TEMPERATURE: 97.8 F | SYSTOLIC BLOOD PRESSURE: 124 MMHG | WEIGHT: 175 LBS | HEART RATE: 76 BPM | BODY MASS INDEX: 26.52 KG/M2 | RESPIRATION RATE: 18 BRPM

## 2023-03-29 DIAGNOSIS — Z12.31 ENCOUNTER FOR SCREENING MAMMOGRAM FOR MALIGNANT NEOPLASM OF BREAST: ICD-10-CM

## 2023-03-29 DIAGNOSIS — C44.92 SQUAMOUS CELL SKIN CANCER: ICD-10-CM

## 2023-03-29 DIAGNOSIS — Z00.00 ROUTINE ADULT HEALTH MAINTENANCE: ICD-10-CM

## 2023-03-29 DIAGNOSIS — Z00.00 ANNUAL PHYSICAL EXAM: ICD-10-CM

## 2023-03-29 DIAGNOSIS — E78.2 MIXED HYPERLIPIDEMIA: Primary | ICD-10-CM

## 2023-03-29 LAB
ALBUMIN SERPL-MCNC: 4.8 G/DL (ref 3.5–5.2)
ALBUMIN/GLOB SERPL: 1.6 G/DL
ALP SERPL-CCNC: 51 U/L (ref 39–117)
ALT SERPL W P-5'-P-CCNC: 22 U/L (ref 1–33)
ANION GAP SERPL CALCULATED.3IONS-SCNC: 11 MMOL/L (ref 5–15)
AST SERPL-CCNC: 24 U/L (ref 1–32)
BASOPHILS # BLD AUTO: 0.05 10*3/MM3 (ref 0–0.2)
BASOPHILS NFR BLD AUTO: 1 % (ref 0–1.5)
BILIRUB SERPL-MCNC: 0.5 MG/DL (ref 0–1.2)
BUN SERPL-MCNC: 10 MG/DL (ref 8–23)
BUN/CREAT SERPL: 13.5 (ref 7–25)
CALCIUM SPEC-SCNC: 9.8 MG/DL (ref 8.6–10.5)
CHLORIDE SERPL-SCNC: 103 MMOL/L (ref 98–107)
CHOLEST SERPL-MCNC: 190 MG/DL (ref 0–200)
CO2 SERPL-SCNC: 27 MMOL/L (ref 22–29)
CREAT SERPL-MCNC: 0.74 MG/DL (ref 0.57–1)
DEPRECATED RDW RBC AUTO: 39.2 FL (ref 37–54)
EGFRCR SERPLBLD CKD-EPI 2021: 92.2 ML/MIN/1.73
EOSINOPHIL # BLD AUTO: 0.11 10*3/MM3 (ref 0–0.4)
EOSINOPHIL NFR BLD AUTO: 2.2 % (ref 0.3–6.2)
ERYTHROCYTE [DISTWIDTH] IN BLOOD BY AUTOMATED COUNT: 12.8 % (ref 12.3–15.4)
GLOBULIN UR ELPH-MCNC: 3 GM/DL
GLUCOSE SERPL-MCNC: 80 MG/DL (ref 65–99)
HCT VFR BLD AUTO: 44.2 % (ref 34–46.6)
HDLC SERPL-MCNC: 52 MG/DL (ref 40–60)
HGB BLD-MCNC: 15 G/DL (ref 12–15.9)
IMM GRANULOCYTES # BLD AUTO: 0.01 10*3/MM3 (ref 0–0.05)
IMM GRANULOCYTES NFR BLD AUTO: 0.2 % (ref 0–0.5)
LDLC SERPL CALC-MCNC: 109 MG/DL (ref 0–100)
LDLC/HDLC SERPL: 2.02 {RATIO}
LYMPHOCYTES # BLD AUTO: 1.59 10*3/MM3 (ref 0.7–3.1)
LYMPHOCYTES NFR BLD AUTO: 31.9 % (ref 19.6–45.3)
MCH RBC QN AUTO: 28.8 PG (ref 26.6–33)
MCHC RBC AUTO-ENTMCNC: 33.9 G/DL (ref 31.5–35.7)
MCV RBC AUTO: 84.8 FL (ref 79–97)
MONOCYTES # BLD AUTO: 0.42 10*3/MM3 (ref 0.1–0.9)
MONOCYTES NFR BLD AUTO: 8.4 % (ref 5–12)
NEUTROPHILS NFR BLD AUTO: 2.8 10*3/MM3 (ref 1.7–7)
NEUTROPHILS NFR BLD AUTO: 56.3 % (ref 42.7–76)
NRBC BLD AUTO-RTO: 0 /100 WBC (ref 0–0.2)
PLATELET # BLD AUTO: 263 10*3/MM3 (ref 140–450)
PMV BLD AUTO: 10.5 FL (ref 6–12)
POTASSIUM SERPL-SCNC: 4 MMOL/L (ref 3.5–5.2)
PROT SERPL-MCNC: 7.8 G/DL (ref 6–8.5)
RBC # BLD AUTO: 5.21 10*6/MM3 (ref 3.77–5.28)
SODIUM SERPL-SCNC: 141 MMOL/L (ref 136–145)
TRIGL SERPL-MCNC: 165 MG/DL (ref 0–150)
TSH SERPL DL<=0.05 MIU/L-ACNC: 1.21 UIU/ML (ref 0.27–4.2)
VLDLC SERPL-MCNC: 29 MG/DL (ref 5–40)
WBC NRBC COR # BLD: 4.98 10*3/MM3 (ref 3.4–10.8)

## 2023-03-29 PROCEDURE — 80050 GENERAL HEALTH PANEL: CPT | Performed by: PHYSICIAN ASSISTANT

## 2023-03-29 PROCEDURE — 80061 LIPID PANEL: CPT | Performed by: PHYSICIAN ASSISTANT

## 2023-03-29 RX ORDER — FEXOFENADINE HCL 180 MG/1
180 TABLET ORAL DAILY
COMMUNITY

## 2023-03-29 RX ORDER — DIMENHYDRINATE 50 MG
TABLET ORAL
COMMUNITY

## 2023-03-29 RX ORDER — ATORVASTATIN CALCIUM 20 MG/1
TABLET, FILM COATED ORAL
COMMUNITY
Start: 2023-03-14

## 2023-03-29 NOTE — PROGRESS NOTES
Office Note     Name: Ana Hart    : 1960     MRN: [id number]     Chief Complaint  Establish Care and Annual Exam    Subjective     History of Present Illness:  Ana Hart is a 62 y.o. female who presents today to establish care and annual physical.    Patient has no complaints at this time.    Patient does use a seatbelt, use sunscreen, helmet when necessary, smoke detectors and carbon monoxide detectors are in home, safe sexual practices and does not participate in illicit drug use.    Recommended annual Dental, Eye and Dermatology exam.  Explained to patient no referral should be required however I am happy to help if one is needed    Review of Systems:   Review of Systems    Past Medical History:   Past Medical History:   Diagnosis Date   • Allergic     Seasonal   • Hyperlipidemia        Past Surgical History:   Past Surgical History:   Procedure Laterality Date   • FRACTURE SURGERY      Right wrist fracture   • SKIN CANCER EXCISION         Immunizations:   Immunization History   Administered Date(s) Administered   • COVID-19 (MODERNA) 1st, 2nd, 3rd Dose Only 2021, 2021, 2021   • Tdap 01/15/2018   • flucelvax quad pfs =>4 YRS 2019        Medications:     Current Outpatient Medications:   •  atorvastatin (LIPITOR) 20 MG tablet, , Disp: , Rfl:   •  Coenzyme Q10 (Co Q-10) 100 MG capsule, Co Q-10, Disp: , Rfl:   •  fexofenadine (ALLEGRA) 180 MG tablet, Take 1 tablet by mouth Daily., Disp: , Rfl:   •  tretinoin (RETIN-A) 0.1 % cream, Apply  topically Every Night., Disp: , Rfl:     Allergies:   No Known Allergies    Family History:   Family History   Problem Relation Age of Onset   • Heart disease Mother    • Osteoporosis Mother    • Stroke Paternal Aunt    • Cancer Paternal Grandmother         unknown   • Breast cancer Neg Hx    • Ovarian cancer Neg Hx        Social History:   Social History     Socioeconomic History   • Marital status:    Tobacco Use   • Smoking status:  "Never   • Smokeless tobacco: Never   Vaping Use   • Vaping Use: Never used   Substance and Sexual Activity   • Alcohol use: No   • Drug use: No   • Sexual activity: Not Currently     Partners: Male         Objective     Vital Signs  /82   Pulse 76   Temp 97.8 °F (36.6 °C) (Infrared)   Resp 18   Ht 172.7 cm (68\")   Wt 79.4 kg (175 lb)   SpO2 98%   BMI 26.61 kg/m²   Estimated body mass index is 26.61 kg/m² as calculated from the following:    Height as of this encounter: 172.7 cm (68\").    Weight as of this encounter: 79.4 kg (175 lb).    BMI is >= 25 and <30. (Overweight) The following options were offered after discussion;: weight loss educational material (shared in after visit summary)      Physical Exam  Vitals and nursing note reviewed. Exam conducted with a chaperone present.   Constitutional:       Appearance: Normal appearance. She is normal weight.   HENT:      Right Ear: Tympanic membrane normal.      Left Ear: Tympanic membrane normal.      Nose: Nose normal.      Mouth/Throat:      Mouth: Mucous membranes are moist.      Pharynx: Oropharynx is clear.   Eyes:      Extraocular Movements: Extraocular movements intact.      Conjunctiva/sclera: Conjunctivae normal.      Pupils: Pupils are equal, round, and reactive to light.   Cardiovascular:      Rate and Rhythm: Normal rate and regular rhythm.      Pulses: Normal pulses.      Heart sounds: Normal heart sounds.   Pulmonary:      Effort: Pulmonary effort is normal.      Breath sounds: Normal breath sounds.   Chest:   Breasts:     Right: Normal.      Left: Normal.   Abdominal:      General: Abdomen is flat. Bowel sounds are normal.      Palpations: Abdomen is soft.   Musculoskeletal:         General: Normal range of motion.   Lymphadenopathy:      Upper Body:      Right upper body: No supraclavicular or axillary adenopathy.      Left upper body: No supraclavicular or axillary adenopathy.   Skin:     General: Skin is warm and dry.   Neurological:    "   General: No focal deficit present.      Mental Status: She is alert.   Psychiatric:         Mood and Affect: Mood normal.         Behavior: Behavior normal.         Thought Content: Thought content normal.         Judgment: Judgment normal.          Assessment and Plan     1. Mixed hyperlipidemia    - Comprehensive Metabolic Panel; Future  - CBC & Differential; Future  - Lipid Panel; Future  - TSH; Future  - TSH  - Lipid Panel  - CBC & Differential  - Comprehensive Metabolic Panel    2. Routine adult health maintenance    - Comprehensive Metabolic Panel; Future  - CBC & Differential; Future  - Lipid Panel; Future  - TSH; Future  - TSH  - Lipid Panel  - CBC & Differential  - Comprehensive Metabolic Panel    3. Squamous cell skin cancer  Patient sees dermatologist annually.    4. Annual physical exam    - Comprehensive Metabolic Panel; Future  - CBC & Differential; Future  - Lipid Panel; Future  - TSH; Future  - Mammo Screening Digital Tomosynthesis Bilateral With CAD; Future  - TSH  - Lipid Panel  - CBC & Differential  - Comprehensive Metabolic Panel    5. Encounter for screening mammogram for malignant neoplasm of breast    - Mammo Screening Digital Tomosynthesis Bilateral With CAD; Future         Follow Up  No follow-ups on file.    ADALID Fitzgerald [facility] INTERNAL MEDICINE & PEDIATRICS  [address] [facility] [address]  [facility] [address] [phone]

## 2023-05-12 ENCOUNTER — HOSPITAL ENCOUNTER (OUTPATIENT)
Dept: MAMMOGRAPHY | Facility: HOSPITAL | Age: 63
Discharge: HOME | End: 2023-05-12
Admitting: PHYSICIAN ASSISTANT
Payer: MEDICAID

## 2023-05-12 DIAGNOSIS — Z00.00 ANNUAL PHYSICAL EXAM: ICD-10-CM

## 2023-05-12 DIAGNOSIS — Z12.31 ENCOUNTER FOR SCREENING MAMMOGRAM FOR MALIGNANT NEOPLASM OF BREAST: ICD-10-CM

## 2023-05-12 PROCEDURE — 77063 BREAST TOMOSYNTHESIS BI: CPT | Performed by: RADIOLOGY

## 2023-05-12 PROCEDURE — 77067 SCR MAMMO BI INCL CAD: CPT

## 2023-05-12 PROCEDURE — 77063 BREAST TOMOSYNTHESIS BI: CPT

## 2023-05-12 PROCEDURE — 77067 SCR MAMMO BI INCL CAD: CPT | Performed by: RADIOLOGY

## 2023-05-23 ENCOUNTER — OFFICE VISIT (OUTPATIENT)
Dept: INTERNAL MEDICINE | Facility: CLINIC | Age: 63
End: 2023-05-23
Payer: MEDICAID

## 2023-05-23 VITALS
WEIGHT: 175 LBS | DIASTOLIC BLOOD PRESSURE: 74 MMHG | HEART RATE: 92 BPM | TEMPERATURE: 98.7 F | BODY MASS INDEX: 26.61 KG/M2 | RESPIRATION RATE: 12 BRPM | SYSTOLIC BLOOD PRESSURE: 110 MMHG

## 2023-05-23 DIAGNOSIS — J01.41 ACUTE RECURRENT PANSINUSITIS: Primary | ICD-10-CM

## 2023-05-23 PROCEDURE — 1159F MED LIST DOCD IN RCRD: CPT | Performed by: PHYSICIAN ASSISTANT

## 2023-05-23 PROCEDURE — 99213 OFFICE O/P EST LOW 20 MIN: CPT | Performed by: PHYSICIAN ASSISTANT

## 2023-05-23 PROCEDURE — 1160F RVW MEDS BY RX/DR IN RCRD: CPT | Performed by: PHYSICIAN ASSISTANT

## 2023-05-23 RX ORDER — TRETINOIN 0.5 MG/G
CREAM TOPICAL
COMMUNITY
Start: 2023-04-25 | End: 2023-05-23 | Stop reason: SDUPTHER

## 2023-05-23 RX ORDER — TRIAMCINOLONE ACETONIDE 55 UG/1
2 SPRAY, METERED NASAL DAILY
COMMUNITY

## 2023-05-23 RX ORDER — AMOXICILLIN AND CLAVULANATE POTASSIUM 875; 125 MG/1; MG/1
1 TABLET, FILM COATED ORAL 2 TIMES DAILY
Qty: 14 TABLET | Refills: 0 | Status: SHIPPED | OUTPATIENT
Start: 2023-05-23

## 2023-05-23 NOTE — PROGRESS NOTES
Office Note     Name: Ana Hart    : 1960     MRN: [id number]     Chief Complaint  Nasal Congestion (X1 week ) and Cough (X1 week )    Subjective     History of Present Illness:  Ana Hart is a 62 y.o. female who presents today for nasal congestion and cough.     She endorses nasal congestion, cough, and sinus pressure. Her face is painful with palpation around her bilateral eyes, and she uses a warm cloth for relief. She went horseback riding about 1 week ago, and experienced head congestion. She takes Allegra and nasal spray to help with her symptoms. She has tried Afrin and Advil Cold and Sinus in the past with no relief. She has a history of sinus infections and thinks she has 1 currently.       Review of Systems:   Review of Systems   HENT: Positive for congestion, facial swelling and sinus pressure.    Respiratory: Positive for cough.        Past Medical History:   Past Medical History:   Diagnosis Date   • Allergic     Seasonal   • Hyperlipidemia        Past Surgical History:   Past Surgical History:   Procedure Laterality Date   • FRACTURE SURGERY      Right wrist fracture   • SKIN CANCER EXCISION         Immunizations:   Immunization History   Administered Date(s) Administered   • COVID-19 (MODERNA) 1st,2nd,3rd Dose Monovalent 2021, 2021, 2021   • Tdap 01/15/2018   • flucelvax quad pfs =>4 YRS 2019        Medications:     Current Outpatient Medications:   •  atorvastatin (LIPITOR) 20 MG tablet, Take 1 tablet by mouth Daily., Disp: , Rfl:   •  Coenzyme Q10 (Co Q-10) 100 MG capsule, Co Q-10, Disp: , Rfl:   •  fexofenadine (ALLEGRA) 180 MG tablet, Take 1 tablet by mouth Daily., Disp: , Rfl:   •  Pseudoephedrine-Ibuprofen (ADVIL COLD/SINUS PO), Take  by mouth As Needed., Disp: , Rfl:   •  tretinoin (RETIN-A) 0.1 % cream, Apply  topically Every Night., Disp: , Rfl:   •  Triamcinolone Acetonide (NASACORT) 55 MCG/ACT nasal inhaler, 2 sprays into the nostril(s) as directed  "by provider Daily., Disp: , Rfl:   •  amoxicillin-clavulanate (AUGMENTIN) 875-125 MG per tablet, Take 1 tablet by mouth 2 (Two) Times a Day., Disp: 14 tablet, Rfl: 0    Allergies:   No Known Allergies    Family History:   Family History   Problem Relation Age of Onset   • Heart disease Mother    • Osteoporosis Mother    • Stroke Paternal Aunt    • Cancer Paternal Grandmother         unknown   • Breast cancer Neg Hx    • Ovarian cancer Neg Hx        Social History:   Social History     Socioeconomic History   • Marital status:    Tobacco Use   • Smoking status: Never   • Smokeless tobacco: Never   Vaping Use   • Vaping Use: Never used   Substance and Sexual Activity   • Alcohol use: No   • Drug use: No   • Sexual activity: Not Currently     Partners: Male         Objective     Vital Signs  /74 (BP Location: Right arm, Patient Position: Sitting, Cuff Size: Adult)   Pulse 92   Temp 98.7 °F (37.1 °C) (Infrared)   Resp 12   Wt 79.4 kg (175 lb)   BMI 26.61 kg/m²   Estimated body mass index is 26.61 kg/m² as calculated from the following:    Height as of 3/29/23: 172.7 cm (68\").    Weight as of this encounter: 79.4 kg (175 lb).          Physical Exam  HENT:      Right Ear: Tympanic membrane normal.      Left Ear: Tympanic membrane normal.      Nose: Nose normal.      Mouth/Throat:      Mouth: Mucous membranes are moist.      Pharynx: Oropharynx is clear. No posterior oropharyngeal erythema.   Eyes:      Extraocular Movements: Extraocular movements intact.      Conjunctiva/sclera: Conjunctivae normal.      Pupils: Pupils are equal, round, and reactive to light.   Cardiovascular:      Rate and Rhythm: Normal rate and regular rhythm.      Heart sounds: No murmur heard.  Pulmonary:      Effort: Pulmonary effort is normal.      Breath sounds: Normal breath sounds.   Musculoskeletal:         General: Normal range of motion.   Lymphadenopathy:      Cervical: No cervical adenopathy.   Skin:     General: Skin is " warm.   Neurological:      General: No focal deficit present.   Psychiatric:         Mood and Affect: Mood normal.         Behavior: Behavior normal.         Thought Content: Thought content normal.         Judgment: Judgment normal.          Assessment and Plan     1. Acute recurrent pansinusitis  - Augmentin was prescribed.   - Advised to take probiotics.   - Patient can take cold medication or Afrin for symptoms.   - amoxicillin-clavulanate (AUGMENTIN) 875-125 MG per tablet; Take 1 tablet by mouth 2 (Two) Times a Day.  Dispense: 14 tablet; Refill: 0     Patient counseled on the side effects of prescribed medication and verbalized good understanding.  Recommended taking probiotics while taking antibiotics.  Patient is to call office for any new or worsening symptoms.  Patient verbalized understanding of indications to report to the ER.    Patient verbalized good understanding of diagnosis and treatment plan.  All questions answered to their satisfaction.      Follow Up  No follow-ups on file.    ADALID Fitzgerald [facility] INTERNAL MEDICINE & PEDIATRICS  [address] [address] [phone]    Transcribed from ambient dictation for Marce Parsons PA-C by Mariangel Tamayo.  05/23/23   14:40 EDT    Patient or patient representative verbalized consent to the visit recording.  I have personally performed the services described in this document as transcribed by the above individual, and it is both accurate and complete.

## 2023-06-13 RX ORDER — ATORVASTATIN CALCIUM 20 MG/1
20 TABLET, FILM COATED ORAL DAILY
Qty: 90 TABLET | Refills: 1 | Status: SHIPPED | OUTPATIENT
Start: 2023-06-13

## 2023-06-13 NOTE — TELEPHONE ENCOUNTER
Caller: Ana Hart    Relationship: Self    Best call back number:    [phone]       Requested Prescriptions:   Requested Prescriptions     Pending Prescriptions Disp Refills    atorvastatin (LIPITOR) 20 MG tablet 90 tablet      Sig: Take 1 tablet by mouth Daily.        Pharmacy where request should be sent: [facility] [id number] [facility] [address] [facility] AT [address] [phone] [facility] [phone]      Last office visit with prescribing clinician: 5/23/2023   Last telemedicine visit with prescribing clinician: Visit date not found   Next office visit with prescribing clinician: 10/3/2023     Additional details provided by patient: PATIENT HAS 6 DAYS REMAINING    Does the patient have less than a 3 day supply:  [] Yes  [x] No    Would you like a call back once the refill request has been completed: [x] Yes [] No    If the office needs to give you a call back, can they leave a voicemail: [] Yes [x] No    Erika Sanders Rep   06/13/23 12:51 EDT

## 2023-08-29 ENCOUNTER — OFFICE VISIT (OUTPATIENT)
Dept: INTERNAL MEDICINE | Facility: CLINIC | Age: 63
End: 2023-08-29
Payer: MEDICAID

## 2023-08-29 VITALS
TEMPERATURE: 98.4 F | DIASTOLIC BLOOD PRESSURE: 80 MMHG | WEIGHT: 169 LBS | SYSTOLIC BLOOD PRESSURE: 110 MMHG | RESPIRATION RATE: 16 BRPM | HEART RATE: 84 BPM | BODY MASS INDEX: 25.7 KG/M2

## 2023-08-29 DIAGNOSIS — E78.2 MIXED HYPERLIPIDEMIA: ICD-10-CM

## 2023-08-29 DIAGNOSIS — B02.9 HERPES ZOSTER WITHOUT COMPLICATION: Primary | ICD-10-CM

## 2023-08-29 RX ORDER — VALACYCLOVIR HYDROCHLORIDE 1 G/1
1000 TABLET, FILM COATED ORAL 3 TIMES DAILY
Qty: 30 TABLET | Refills: 0 | Status: SHIPPED | OUTPATIENT
Start: 2023-08-29

## 2023-08-29 RX ORDER — ATORVASTATIN CALCIUM 20 MG/1
20 TABLET, FILM COATED ORAL DAILY
Qty: 90 TABLET | Refills: 1 | Status: SHIPPED | OUTPATIENT
Start: 2023-08-29

## 2023-08-29 RX ORDER — ACETAMINOPHEN 500 MG
1000 TABLET ORAL EVERY 6 HOURS PRN
COMMUNITY

## 2023-08-29 NOTE — PROGRESS NOTES
Office Note     Name: Ana Hart    : 1960     MRN: [id number]     Chief Complaint  Rash (Right arm, stinging, tingling, numbing pain x1 week )    Subjective     History of Present Illness:  Ana Hart is a 62 y.o. female who presents today for a rash in her armpit.    She noticed swelling in her right armpit. She has red bumps that seems to be getting larger and numbness. She also notes the rash is by her breast. She has tried cortisone cream but she notes it caused her increased pain.     She states she has been under an increased amount of stress due to family issues.     Review of Systems:   Review of Systems    Past Medical History:   Past Medical History:   Diagnosis Date    Allergic     Seasonal    Hyperlipidemia        Past Surgical History:   Past Surgical History:   Procedure Laterality Date    FRACTURE SURGERY      Right wrist fracture    SKIN CANCER EXCISION         Immunizations:   Immunization History   Administered Date(s) Administered    COVID-19 (MODERNA) 1st,2nd,3rd Dose Monovalent 2021, 2021, 2021    Tdap 01/15/2018    flucelvax quad pfs =>4 YRS 2019        Medications:     Current Outpatient Medications:     acetaminophen (TYLENOL) 500 MG tablet, Take 2 tablets by mouth Every 6 (Six) Hours As Needed for Mild Pain., Disp: , Rfl:     atorvastatin (LIPITOR) 20 MG tablet, Take 1 tablet by mouth Daily., Disp: 90 tablet, Rfl: 1    Coenzyme Q10 (Co Q-10) 100 MG capsule, Co Q-10, Disp: , Rfl:     tretinoin (RETIN-A) 0.1 % cream, Apply  topically Every Night., Disp: , Rfl:     mupirocin (BACTROBAN) 2 % ointment, Apply 1 application  topically to the appropriate area as directed 3 (Three) Times a Day., Disp: 15 g, Rfl: 0    valACYclovir (Valtrex) 1000 MG tablet, Take 1 tablet by mouth 3 (Three) Times a Day., Disp: 30 tablet, Rfl: 0    Allergies:   No Known Allergies    Family History:   Family History   Problem Relation Age of Onset    Heart disease Mother      "Osteoporosis Mother     Stroke Paternal Aunt     Cancer Paternal Grandmother         unknown    Breast cancer Neg Hx     Ovarian cancer Neg Hx        Social History:   Social History     Socioeconomic History    Marital status:    Tobacco Use    Smoking status: Never    Smokeless tobacco: Never   Vaping Use    Vaping Use: Never used   Substance and Sexual Activity    Alcohol use: No    Drug use: No    Sexual activity: Not Currently     Partners: Male         Objective     Vital Signs  /80 (BP Location: Right arm, Patient Position: Sitting, Cuff Size: Adult)   Pulse 84   Temp 98.4 øF (36.9 øC) (Infrared)   Resp 16   Wt 76.7 kg (169 lb)   BMI 25.70 kg/mý   Estimated body mass index is 25.7 kg/mý as calculated from the following:    Height as of 3/29/23: 172.7 cm (68\").    Weight as of this encounter: 76.7 kg (169 lb).            Physical Exam  Vitals and nursing note reviewed.   Constitutional:       Appearance: Normal appearance.   Cardiovascular:      Rate and Rhythm: Normal rate and regular rhythm.      Pulses: Normal pulses.      Heart sounds: Normal heart sounds.   Pulmonary:      Effort: Pulmonary effort is normal.      Breath sounds: Normal breath sounds.   Skin:     General: Skin is warm and dry.      Comments: Small cluster of erythematous vesicles in right axilla   Neurological:      General: No focal deficit present.      Mental Status: She is alert.   Psychiatric:         Mood and Affect: Mood normal.         Behavior: Behavior normal.        Assessment and Plan     1. Herpes zoster without complication    - valACYclovir (Valtrex) 1000 MG tablet; Take 1 tablet by mouth 3 (Three) Times a Day.  Dispense: 30 tablet; Refill: 0  - mupirocin (BACTROBAN) 2 % ointment; Apply 1 application  topically to the appropriate area as directed 3 (Three) Times a Day.  Dispense: 15 g; Refill: 0  She will be prescribed Bactroban to prevent a secondary infection. She may take Motrin or Tylenol for pain as " needed. If her pain is unresolved other pain medication options may be discussed. She was advised to avoid skin to skin contact with small children and elderly family members until symptoms resolve. She was encouraged to take her oral medication with food.   2. Mixed hyperlipidemia    - atorvastatin (LIPITOR) 20 MG tablet; Take 1 tablet by mouth Daily.  Dispense: 90 tablet; Refill: 1     Patient verbalized good understanding of diagnosis and treatment plan.  All questions answered to their satisfaction.       Red flag symptoms and indications to report to ER discussed with patient who verbalized good understanding.       Follow Up  No follow-ups on file.    ADALID Fitzgerald [facility] INTERNAL MEDICINE & PEDIATRICS  [address] [address] [phone]  Transcribed from ambient dictation for Marce Parsons PA-C by Alejandra Reynoso.  08/29/23   14:44 EDT    Patient or patient representative verbalized consent to the visit recording.  I have personally performed the services described in this document as transcribed by the above individual, and it is both accurate and complete.

## 2024-03-11 ENCOUNTER — TRANSCRIBE ORDERS (OUTPATIENT)
Dept: ADMINISTRATIVE | Facility: HOSPITAL | Age: 64
End: 2024-03-11
Payer: COMMERCIAL

## 2024-03-11 DIAGNOSIS — E78.2 MIXED HYPERLIPIDEMIA: ICD-10-CM

## 2024-03-11 DIAGNOSIS — Z12.31 VISIT FOR SCREENING MAMMOGRAM: Primary | ICD-10-CM

## 2024-03-11 RX ORDER — ATORVASTATIN CALCIUM 20 MG/1
20 TABLET, FILM COATED ORAL DAILY
Qty: 90 TABLET | Refills: 1 | Status: SHIPPED | OUTPATIENT
Start: 2024-03-11

## 2024-03-11 NOTE — TELEPHONE ENCOUNTER
Caller: May Ana OLVIN    Relationship: Self    Best call back number: [phone]     Requested Prescriptions:   Requested Prescriptions     Pending Prescriptions Disp Refills    atorvastatin (LIPITOR) 20 MG tablet 90 tablet 1     Sig: Take 1 tablet by mouth Daily.        Pharmacy where request should be sent: [facility] [id number] [facility] [address] [facility] AT [address] [phone] [facility] [phone]      Last office visit with prescribing clinician: 8/29/2023   Last telemedicine visit with prescribing clinician: Visit date not found   Next office visit with prescribing clinician: 4/1/2024     Additional details provided by patient: HAS 3 DAYS LEFT, NEEDS REFILL TO GET HER TO [id number] APPT    Does the patient have less than a 3 day supply:  [] Yes  [] No    Would you like a call back once the refill request has been completed: [x] Yes [] No    If the office needs to give you a call back, can they leave a voicemail: [] Yes [] No    Erika Elder Rep   03/11/24 09:51 EDT

## 2024-05-16 ENCOUNTER — HOSPITAL ENCOUNTER (OUTPATIENT)
Dept: MAMMOGRAPHY | Facility: HOSPITAL | Age: 64
Discharge: HOME | End: 2024-05-16
Admitting: PHYSICIAN ASSISTANT
Payer: COMMERCIAL

## 2024-05-16 DIAGNOSIS — Z12.31 VISIT FOR SCREENING MAMMOGRAM: ICD-10-CM

## 2024-05-16 PROCEDURE — 77067 SCR MAMMO BI INCL CAD: CPT

## 2024-05-16 PROCEDURE — 77063 BREAST TOMOSYNTHESIS BI: CPT

## 2024-06-18 ENCOUNTER — HOSPITAL ENCOUNTER (OUTPATIENT)
Dept: ULTRASOUND IMAGING | Facility: HOSPITAL | Age: 64
Discharge: HOME | End: 2024-06-18
Payer: COMMERCIAL

## 2024-06-18 ENCOUNTER — HOSPITAL ENCOUNTER (OUTPATIENT)
Dept: MAMMOGRAPHY | Facility: HOSPITAL | Age: 64
Discharge: HOME | End: 2024-06-18
Payer: COMMERCIAL

## 2024-06-18 DIAGNOSIS — R92.8 ABNORMAL MAMMOGRAM: ICD-10-CM

## 2024-06-18 PROCEDURE — G0279 TOMOSYNTHESIS, MAMMO: HCPCS

## 2024-06-18 PROCEDURE — 77066 DX MAMMO INCL CAD BI: CPT | Performed by: RADIOLOGY

## 2024-06-18 PROCEDURE — 76642 ULTRASOUND BREAST LIMITED: CPT | Performed by: RADIOLOGY

## 2024-06-18 PROCEDURE — 77066 DX MAMMO INCL CAD BI: CPT

## 2024-06-18 PROCEDURE — 77062 BREAST TOMOSYNTHESIS BI: CPT | Performed by: RADIOLOGY

## 2024-06-18 PROCEDURE — 76642 ULTRASOUND BREAST LIMITED: CPT

## 2024-06-26 ENCOUNTER — OFFICE VISIT (OUTPATIENT)
Dept: INTERNAL MEDICINE | Facility: CLINIC | Age: 64
End: 2024-06-26
Payer: COMMERCIAL

## 2024-06-26 VITALS
WEIGHT: 168 LBS | BODY MASS INDEX: 25.46 KG/M2 | TEMPERATURE: 97.3 F | SYSTOLIC BLOOD PRESSURE: 116 MMHG | RESPIRATION RATE: 20 BRPM | DIASTOLIC BLOOD PRESSURE: 74 MMHG | HEIGHT: 68 IN | HEART RATE: 84 BPM

## 2024-06-26 DIAGNOSIS — E78.2 MIXED HYPERLIPIDEMIA: ICD-10-CM

## 2024-06-26 DIAGNOSIS — Z00.00 ANNUAL PHYSICAL EXAM: Primary | ICD-10-CM

## 2024-06-26 LAB
ALBUMIN SERPL-MCNC: 4.8 G/DL (ref 3.5–5.2)
ALBUMIN/GLOB SERPL: 1.7 G/DL
ALP SERPL-CCNC: 50 U/L (ref 39–117)
ALT SERPL W P-5'-P-CCNC: 31 U/L (ref 1–33)
ANION GAP SERPL CALCULATED.3IONS-SCNC: 12 MMOL/L (ref 5–15)
AST SERPL-CCNC: 27 U/L (ref 1–32)
BASOPHILS # BLD AUTO: 0.07 10*3/MM3 (ref 0–0.2)
BASOPHILS NFR BLD AUTO: 1.3 % (ref 0–1.5)
BILIRUB SERPL-MCNC: 0.7 MG/DL (ref 0–1.2)
BUN SERPL-MCNC: 14 MG/DL (ref 8–23)
BUN/CREAT SERPL: 20.6 (ref 7–25)
CALCIUM SPEC-SCNC: 9.9 MG/DL (ref 8.6–10.5)
CHLORIDE SERPL-SCNC: 101 MMOL/L (ref 98–107)
CHOLEST SERPL-MCNC: 186 MG/DL (ref 0–200)
CO2 SERPL-SCNC: 29 MMOL/L (ref 22–29)
CREAT SERPL-MCNC: 0.68 MG/DL (ref 0.57–1)
DEPRECATED RDW RBC AUTO: 40.1 FL (ref 37–54)
EGFRCR SERPLBLD CKD-EPI 2021: 98.6 ML/MIN/1.73
EOSINOPHIL # BLD AUTO: 0.12 10*3/MM3 (ref 0–0.4)
EOSINOPHIL NFR BLD AUTO: 2.2 % (ref 0.3–6.2)
ERYTHROCYTE [DISTWIDTH] IN BLOOD BY AUTOMATED COUNT: 12.9 % (ref 12.3–15.4)
GLOBULIN UR ELPH-MCNC: 2.9 GM/DL
GLUCOSE SERPL-MCNC: 94 MG/DL (ref 65–99)
HCT VFR BLD AUTO: 46.3 % (ref 34–46.6)
HDLC SERPL-MCNC: 56 MG/DL (ref 40–60)
HGB BLD-MCNC: 15.2 G/DL (ref 12–15.9)
IMM GRANULOCYTES # BLD AUTO: 0.01 10*3/MM3 (ref 0–0.05)
IMM GRANULOCYTES NFR BLD AUTO: 0.2 % (ref 0–0.5)
LDLC SERPL CALC-MCNC: 100 MG/DL (ref 0–100)
LDLC/HDLC SERPL: 1.71 {RATIO}
LYMPHOCYTES # BLD AUTO: 1.51 10*3/MM3 (ref 0.7–3.1)
LYMPHOCYTES NFR BLD AUTO: 28.2 % (ref 19.6–45.3)
MCH RBC QN AUTO: 28 PG (ref 26.6–33)
MCHC RBC AUTO-ENTMCNC: 32.8 G/DL (ref 31.5–35.7)
MCV RBC AUTO: 85.3 FL (ref 79–97)
MONOCYTES # BLD AUTO: 0.53 10*3/MM3 (ref 0.1–0.9)
MONOCYTES NFR BLD AUTO: 9.9 % (ref 5–12)
NEUTROPHILS NFR BLD AUTO: 3.12 10*3/MM3 (ref 1.7–7)
NEUTROPHILS NFR BLD AUTO: 58.2 % (ref 42.7–76)
NRBC BLD AUTO-RTO: 0 /100 WBC (ref 0–0.2)
PLATELET # BLD AUTO: 276 10*3/MM3 (ref 140–450)
PMV BLD AUTO: 11.4 FL (ref 6–12)
POTASSIUM SERPL-SCNC: 4.2 MMOL/L (ref 3.5–5.2)
PROT SERPL-MCNC: 7.7 G/DL (ref 6–8.5)
RBC # BLD AUTO: 5.43 10*6/MM3 (ref 3.77–5.28)
SODIUM SERPL-SCNC: 142 MMOL/L (ref 136–145)
TRIGL SERPL-MCNC: 172 MG/DL (ref 0–150)
TSH SERPL DL<=0.05 MIU/L-ACNC: 1.11 UIU/ML (ref 0.27–4.2)
VLDLC SERPL-MCNC: 30 MG/DL (ref 5–40)
WBC NRBC COR # BLD AUTO: 5.36 10*3/MM3 (ref 3.4–10.8)

## 2024-06-26 PROCEDURE — 80061 LIPID PANEL: CPT | Performed by: PHYSICIAN ASSISTANT

## 2024-06-26 PROCEDURE — 85025 COMPLETE CBC W/AUTO DIFF WBC: CPT | Performed by: PHYSICIAN ASSISTANT

## 2024-06-26 PROCEDURE — 36415 COLL VENOUS BLD VENIPUNCTURE: CPT | Performed by: PHYSICIAN ASSISTANT

## 2024-06-26 PROCEDURE — 80053 COMPREHEN METABOLIC PANEL: CPT | Performed by: PHYSICIAN ASSISTANT

## 2024-06-26 PROCEDURE — 84443 ASSAY THYROID STIM HORMONE: CPT | Performed by: PHYSICIAN ASSISTANT

## 2024-06-26 PROCEDURE — 99396 PREV VISIT EST AGE 40-64: CPT | Performed by: PHYSICIAN ASSISTANT

## 2024-06-26 RX ORDER — UREA 10 %
5 LOTION (ML) TOPICAL
COMMUNITY

## 2024-06-26 NOTE — PROGRESS NOTES
Office Note     Name: Ana Hart    : 1960     MRN: [id number]     Chief Complaint  Annual Exam and Gynecologic Exam    Subjective     History of Present Illness:  Ana Hart is a 63 y.o. female who presents today for annual physical.    Patient reports she feels safe in her home.    Lab Results   Component Value Date    WBC 4.98 2023    HGB 15.0 2023    HCT 44.2 2023    MCV 84.8 2023     2023       Lab Results   Component Value Date    GLUCOSE 80 2023    BUN 10 2023    CREATININE 0.74 2023    EGFR 92.2 2023    BCR 13.5 2023    K 4.0 2023    CO2 27.0 2023    CALCIUM 9.8 2023    ALBUMIN 4.8 2023    BILITOT 0.5 2023    AST 24 2023    ALT 22 2023       Lab Results   Component Value Date    CHOL 190 2023    TRIG 165 (H) 2023    HDL 52 2023     (H) 2023     Lab Results   Component Value Date    TSH 1.210 2023       Review of Systems:   Review of Systems    Past Medical History:   Past Medical History:   Diagnosis Date    Allergic     Seasonal    Hyperlipidemia        Past Surgical History:   Past Surgical History:   Procedure Laterality Date    FRACTURE SURGERY      Right wrist fracture    SKIN CANCER EXCISION         Immunizations:   Immunization History   Administered Date(s) Administered    31-influenza Vac Quardvalent Preservativ 10/01/2020    COVID-19 (MODERNA) 1st,2nd,3rd Dose Monovalent 2021, 2021, 2021    Tdap 01/15/2018    flucelvax quad pfs =>4 YRS 2019        Medications:     Current Outpatient Medications:     atorvastatin (LIPITOR) 20 MG tablet, Take 1 tablet by mouth Daily., Disp: 90 tablet, Rfl: 1    Coenzyme Q10 (Co Q-10) 100 MG capsule, Co Q-10, Disp: , Rfl:     COLLAGEN PO, Take  by mouth Daily., Disp: , Rfl:     melatonin 5 MG tablet tablet, Take 1 tablet by mouth., Disp: , Rfl:     tretinoin (RETIN-A) 0.1 % cream, Apply  " topically Every Night., Disp: , Rfl:     Allergies:   No Known Allergies    Family History:   Family History   Problem Relation Age of Onset    Heart disease Mother     Osteoporosis Mother     Stroke Paternal Aunt     Cancer Paternal Grandmother         unknown    Breast cancer Neg Hx     Ovarian cancer Neg Hx        Social History:   Social History     Socioeconomic History    Marital status:    Tobacco Use    Smoking status: Never    Smokeless tobacco: Never   Vaping Use    Vaping status: Never Used   Substance and Sexual Activity    Alcohol use: No    Drug use: Never    Sexual activity: Not Currently     Partners: Male         Objective     Vital Signs  /74 (BP Location: Left arm, Patient Position: Sitting, Cuff Size: Adult)   Pulse 84   Temp 97.3 °F (36.3 °C) (Infrared)   Resp 20   Ht 172.7 cm (68\")   Wt 76.2 kg (168 lb)   BMI 25.54 kg/m²   Estimated body mass index is 25.54 kg/m² as calculated from the following:    Height as of this encounter: 172.7 cm (68\").    Weight as of this encounter: 76.2 kg (168 lb).            Physical Exam  Vitals and nursing note reviewed.   Constitutional:       Appearance: Normal appearance.   Cardiovascular:      Rate and Rhythm: Normal rate and regular rhythm.      Pulses: Normal pulses.      Heart sounds: Normal heart sounds.   Pulmonary:      Effort: Pulmonary effort is normal.      Breath sounds: Normal breath sounds.   Chest:      Comments: Patient politely declined   Genitourinary:     Comments: Patient politely declined  Skin:     General: Skin is warm and dry.   Neurological:      Mental Status: She is alert.   Psychiatric:         Mood and Affect: Mood normal.         Behavior: Behavior normal.          Assessment and Plan     1. Annual physical exam    - Comprehensive Metabolic Panel; Future  - CBC & Differential; Future  - Lipid Panel; Future  - TSH; Future    2. Mixed hyperlipidemia    - Lipid Panel; Future       Reviewed medications, potential " side effects and signs and symptoms to report. Discussed risk versus benefits of treatment plan with patient and/or family-including medications, labs and radiology that may be ordered. Addressed questions and concerns during visit. Patient and/or family verbalized understanding and agree with plan. Instructed to call the office with any questions and report to ER with any life-threatening symptoms.     Patient does use a seatbelt, use sunscreen, helmet when necessary, smoke detectors and carbon monoxide detectors are in home, safe sexual practices and does not participate in illicit drug use.    Recommended annual Dental, Eye and Dermatology exam.  Explained to patient no referral should be required however I am happy to help if one is needed      Follow Up  No follow-ups on file.    ADALID Fitzgerald PAZ [facility] INTERNAL MEDICINE & PEDIATRICS  [address] [address] [phone]

## 2024-06-26 NOTE — PATIENT INSTRUCTIONS
MyPlate from USDA    MyPlate is an outline of a general healthy diet based on the Dietary Guidelines for Americans, 2451-9606, from the U.[place]. Department of Agriculture (USDA). It sets guidelines for how much food you should eat from each food group based on your age, sex, and level of physical activity.  What are tips for following MyPlate?  To follow MyPlate recommendations:  Eat a wide variety of fruits and vegetables, grains, and protein foods.  Serve smaller portions and eat less food throughout the day.  Limit portion sizes to avoid overeating.  Enjoy your food.  Get at least 150 minutes of exercise every week. This is about 30 minutes each day, 5 or more days per week.  It can be difficult to have every meal look like MyPlate. Think about MyPlate as eating guidelines for an entire day, rather than each individual meal.  Fruits and vegetables  Make one half of your plate fruits and vegetables.  Eat many different colors of fruits and vegetables each day.  For a 2,000-calorie daily food plan, eat:  2½ cups of vegetables every day.  2 cups of fruit every day.  1 cup is equal to:  1 cup raw or cooked vegetables.  1 cup raw fruit.  1 medium-sized orange, apple, or banana.  1 cup 100% fruit or vegetable juice.  2 cups raw leafy greens, such as lettuce, spinach, or kale.  ½ cup dried fruit.  Grains  One fourth of your plate should be grains.  Make at least half of the grains you eat each day whole grains.  For a 2,000-calorie daily food plan, eat 6 oz of grains every day.  1 oz is equal to:  1 slice bread.  1 cup cereal.  ½ cup cooked rice, cereal, or pasta.  Protein  One fourth of your plate should be protein.  Eat a wide variety of protein foods, including meat, poultry, fish, eggs, beans, nuts, and tofu.  For a 2,000-calorie daily food plan, eat 5½ oz of protein every day.  1 oz is equal to:  1 oz meat, poultry, or fish.  ¼ cup cooked beans.  1 egg.  ½ oz nuts or seeds.  1 Tbsp peanut butter.  Dairy  Drink fat-free  or low-fat (1%) milk.  Eat or drink dairy as a side to meals.  For a 2,000-calorie daily food plan, eat or drink 3 cups of dairy every day.  1 cup is equal to:  1 cup milk, yogurt, cottage cheese, or soy milk (soy beverage).  2 oz processed cheese.  1½ oz natural cheese.  Fats, oils, salt, and sugars  Only small amounts of oils are recommended.  Avoid foods that are high in calories and low in nutritional value (empty calories), like foods high in fat or added sugars.  Choose foods that are low in salt (sodium). Choose foods that have less than 140 milligrams (mg) of sodium per serving.  Drink water instead of sugary drinks. Drink enough fluid to keep your urine pale yellow.  Where to find support  Work with your health care provider or a dietitian to develop a customized eating plan that is right for you.  Download an santi (mobile application) to help you track your daily food intake.  Where to find more information  USDA: ChooseMyPlate.gov  Summary  MyPlate is a general guideline for healthy eating from the USDA. It is based on the Dietary Guidelines for Americans, 6174-7213.  In general, fruits and vegetables should take up one half of your plate, grains should take up one fourth of your plate, and protein should take up one fourth of your plate.  This information is not intended to replace advice given to you by your health care provider. Make sure you discuss any questions you have with your health care provider.  Document Revised: 11/08/2021 Document Reviewed: 11/08/2021  [employer] Patient Education © 2024 [employer]

## 2024-08-29 ENCOUNTER — OFFICE VISIT (OUTPATIENT)
Dept: INTERNAL MEDICINE | Facility: CLINIC | Age: 64
End: 2024-08-29
Payer: COMMERCIAL

## 2024-08-29 ENCOUNTER — HOSPITAL ENCOUNTER (OUTPATIENT)
Dept: GENERAL RADIOLOGY | Facility: HOSPITAL | Age: 64
Discharge: HOME | End: 2024-08-29
Admitting: PHYSICIAN ASSISTANT
Payer: COMMERCIAL

## 2024-08-29 VITALS
RESPIRATION RATE: 16 BRPM | BODY MASS INDEX: 26.15 KG/M2 | DIASTOLIC BLOOD PRESSURE: 78 MMHG | WEIGHT: 172 LBS | SYSTOLIC BLOOD PRESSURE: 130 MMHG | OXYGEN SATURATION: 98 % | TEMPERATURE: 97.7 F | HEART RATE: 72 BPM

## 2024-08-29 DIAGNOSIS — S69.92XA INJURY OF RING FINGER, LEFT, INITIAL ENCOUNTER: Primary | ICD-10-CM

## 2024-08-29 DIAGNOSIS — S69.92XA INJURY OF RING FINGER, LEFT, INITIAL ENCOUNTER: ICD-10-CM

## 2024-08-29 PROCEDURE — 99213 OFFICE O/P EST LOW 20 MIN: CPT | Performed by: PHYSICIAN ASSISTANT

## 2024-08-29 PROCEDURE — 73130 X-RAY EXAM OF HAND: CPT

## 2024-08-29 NOTE — PROGRESS NOTES
"    Office Note     Name: Ana Hart    : 1960     MRN: [id number]     Chief Complaint  Finger Injury (L ring finger, injured falling off porch/[place] it pop back into place, has been icing)    Subjective     History of Present Illness:  Ana Hart is a 63 y.o. female who presents today for left ring finger injury.  Patient reports yesterday she tripped and when she fell she injured her finger causing it to be visibly displaced. Reports her finger \"popped\" back into place on its own.  Patient reports she has been treating with ice.  She reports only mild pain but significant edema.. She did removed her ring before the swelling started. Patient reports mild decreased ROM due to swelling.     Review of Systems:   Review of Systems    Past Medical History:   Past Medical History:   Diagnosis Date    Allergic     Seasonal    Hyperlipidemia        Past Surgical History:   Past Surgical History:   Procedure Laterality Date    FRACTURE SURGERY      Right wrist fracture    SKIN CANCER EXCISION         Immunizations:   Immunization History   Administered Date(s) Administered    31-influenza Vac Quardvalent Preservativ 10/01/2020    COVID-19 (MODERNA) 1st,2nd,3rd Dose Monovalent 2021, 2021, 2021    Tdap 01/15/2018    flucelvax quad pfs =>4 YRS 2019        Medications:     Current Outpatient Medications:     atorvastatin (LIPITOR) 20 MG tablet, Take 1 tablet by mouth Daily., Disp: 90 tablet, Rfl: 1    Coenzyme Q10 (Co Q-10) 100 MG capsule, Co Q-10, Disp: , Rfl:     COLLAGEN PO, Take  by mouth Daily., Disp: , Rfl:     melatonin 5 MG tablet tablet, Take 1 tablet by mouth., Disp: , Rfl:     tretinoin (RETIN-A) 0.1 % cream, Apply  topically Every Night., Disp: , Rfl:     Allergies:   No Known Allergies    Family History:   Family History   Problem Relation Age of Onset    Heart disease Mother     Osteoporosis Mother     Stroke Paternal Aunt     Cancer Paternal Grandmother         unknown    Breast " "cancer Neg Hx     Ovarian cancer Neg Hx        Social History:   Social History     Socioeconomic History    Marital status:    Tobacco Use    Smoking status: Never    Smokeless tobacco: Never   Vaping Use    Vaping status: Never Used   Substance and Sexual Activity    Alcohol use: No    Drug use: Never    Sexual activity: Not Currently     Partners: Male         Objective     Vital Signs  /78 (BP Location: Right arm, Patient Position: Sitting, Cuff Size: Adult)   Pulse 72   Temp 97.7 °F (36.5 °C) (Infrared)   Resp 16   Wt 78 kg (172 lb)   SpO2 98%   BMI 26.15 kg/m²   Estimated body mass index is 26.15 kg/m² as calculated from the following:    Height as of 6/26/24: 172.7 cm (68\").    Weight as of this encounter: 78 kg (172 lb).            Physical Exam  Vitals and nursing note reviewed.   Constitutional:       Appearance: Normal appearance.   Cardiovascular:      Rate and Rhythm: Normal rate and regular rhythm.      Pulses: Normal pulses.      Heart sounds: Normal heart sounds.   Pulmonary:      Effort: Pulmonary effort is normal.      Breath sounds: Normal breath sounds.   Musculoskeletal:      Right hand: Normal.      Left hand: Swelling (PIP ring finger with bruising and edema) present. No deformity, lacerations, tenderness or bony tenderness. Decreased range of motion (slight decreased ROM at 4th digit PIP due to swelling). Normal strength. Normal sensation. There is no disruption of two-point discrimination. Normal capillary refill. Normal pulse.   Skin:     General: Skin is warm and dry.   Neurological:      Mental Status: She is alert.   Psychiatric:         Mood and Affect: Mood normal.         Behavior: Behavior normal.          Assessment and Plan     1. Injury of ring finger, left, initial encounter    - XR Hand 3+ View Left; Future     Reviewed medications, potential side effects and signs and symptoms to report. Discussed risk versus benefits of treatment plan with patient and/or " family-including medications, labs and radiology that may be ordered. Addressed questions and concerns during visit. Patient and/or family verbalized understanding and agree with plan. Instructed to call the office with any questions and report to ER with any life-threatening symptoms.       Follow Up  No follow-ups on file.    ADALID Fitzgerald [facility]  [address] [address] [phone]

## 2024-09-19 DIAGNOSIS — E78.2 MIXED HYPERLIPIDEMIA: ICD-10-CM

## 2024-09-19 RX ORDER — ATORVASTATIN CALCIUM 20 MG/1
20 TABLET, FILM COATED ORAL DAILY
Qty: 90 TABLET | Refills: 1 | Status: SHIPPED | OUTPATIENT
Start: 2024-09-19

## 2025-03-18 DIAGNOSIS — E78.2 MIXED HYPERLIPIDEMIA: ICD-10-CM

## 2025-03-18 RX ORDER — ATORVASTATIN CALCIUM 20 MG/1
20 TABLET, FILM COATED ORAL DAILY
Qty: 90 TABLET | Refills: 0 | Status: SHIPPED | OUTPATIENT
Start: 2025-03-18

## 2025-06-18 ENCOUNTER — TRANSCRIBE ORDERS (OUTPATIENT)
Dept: ADMINISTRATIVE | Facility: HOSPITAL | Age: 65
End: 2025-06-18
Payer: COMMERCIAL

## 2025-06-18 DIAGNOSIS — Z12.31 VISIT FOR SCREENING MAMMOGRAM: Primary | ICD-10-CM

## 2025-06-27 DIAGNOSIS — E78.2 MIXED HYPERLIPIDEMIA: ICD-10-CM

## 2025-06-27 RX ORDER — ATORVASTATIN CALCIUM 20 MG/1
20 TABLET, FILM COATED ORAL DAILY
Qty: 90 TABLET | Refills: 0 | Status: SHIPPED | OUTPATIENT
Start: 2025-06-27

## 2025-06-27 NOTE — TELEPHONE ENCOUNTER
Caller: May Ana OLVIN    Relationship: Self    Best call back number: [phone]     Requested Prescriptions:   Requested Prescriptions     Pending Prescriptions Disp Refills    atorvastatin (LIPITOR) 20 MG tablet 90 tablet 0     Sig: Take 1 tablet by mouth Daily.        Pharmacy where request should be sent: [facility] [id number] [facility] [address] [phone] [facility] [phone]      Last office visit with prescribing clinician: Visit date not found   Last telemedicine visit with prescribing clinician: Visit date not found   Next office visit with prescribing clinician: 7/2/2025     Additional details provided by patient: PATIENT HAS CALLED REQUESTING A NEW PRESCRIPTION ON ABOVE MEDICATION. PATIENT STATES SHE WILL RUN OUT BEFORE HER NEXT APPOINTMENT.     Does the patient have less than a 3 day supply:  [x] Yes  [] No    Would you like a call back once the refill request has been completed: [] Yes [x] No    If the office needs to give you a call back, can they leave a voicemail: [] Yes [x] No    Uri Rebolledo   06/27/25 11:52 EDT

## 2025-07-10 ENCOUNTER — HOSPITAL ENCOUNTER (OUTPATIENT)
Dept: MAMMOGRAPHY | Facility: HOSPITAL | Age: 65
Discharge: HOME | End: 2025-07-10
Admitting: NURSE PRACTITIONER
Payer: COMMERCIAL

## 2025-07-10 DIAGNOSIS — Z12.31 VISIT FOR SCREENING MAMMOGRAM: ICD-10-CM

## 2025-07-10 LAB
NCCN CRITERIA FLAG: NORMAL
TYRER CUZICK SCORE: 5

## 2025-07-10 PROCEDURE — 77063 BREAST TOMOSYNTHESIS BI: CPT

## 2025-07-10 PROCEDURE — 77067 SCR MAMMO BI INCL CAD: CPT

## 2025-08-26 ENCOUNTER — OFFICE VISIT (OUTPATIENT)
Dept: INTERNAL MEDICINE | Facility: CLINIC | Age: 65
End: 2025-08-26
Payer: COMMERCIAL

## 2025-08-26 VITALS
RESPIRATION RATE: 16 BRPM | SYSTOLIC BLOOD PRESSURE: 102 MMHG | DIASTOLIC BLOOD PRESSURE: 62 MMHG | HEART RATE: 81 BPM | HEIGHT: 69 IN | BODY MASS INDEX: 25.27 KG/M2 | TEMPERATURE: 97.7 F | WEIGHT: 170.6 LBS

## 2025-08-26 DIAGNOSIS — Z00.00 ENCOUNTER FOR WELLNESS EXAMINATION: Primary | ICD-10-CM

## 2025-08-26 DIAGNOSIS — E78.2 MIXED HYPERLIPIDEMIA: ICD-10-CM

## 2025-08-26 DIAGNOSIS — Z12.12 ENCOUNTER FOR COLORECTAL CANCER SCREENING: ICD-10-CM

## 2025-08-26 DIAGNOSIS — Z12.11 ENCOUNTER FOR COLORECTAL CANCER SCREENING: ICD-10-CM

## 2025-08-26 DIAGNOSIS — Z12.31 ENCOUNTER FOR SCREENING MAMMOGRAM FOR MALIGNANT NEOPLASM OF BREAST: ICD-10-CM

## 2025-08-26 DIAGNOSIS — N76.0 ACUTE VAGINITIS: ICD-10-CM

## 2025-08-26 LAB
BASOPHILS # BLD AUTO: 0.05 10*3/MM3 (ref 0–0.2)
BASOPHILS NFR BLD AUTO: 0.9 % (ref 0–1.5)
DEPRECATED RDW RBC AUTO: 42.8 FL (ref 37–54)
EOSINOPHIL # BLD AUTO: 0.13 10*3/MM3 (ref 0–0.4)
EOSINOPHIL NFR BLD AUTO: 2.3 % (ref 0.3–6.2)
ERYTHROCYTE [DISTWIDTH] IN BLOOD BY AUTOMATED COUNT: 13.2 % (ref 12.3–15.4)
HCT VFR BLD AUTO: 48 % (ref 34–46.6)
HGB BLD-MCNC: 15.2 G/DL (ref 12–15.9)
IMM GRANULOCYTES # BLD AUTO: 0.02 10*3/MM3 (ref 0–0.05)
IMM GRANULOCYTES NFR BLD AUTO: 0.4 % (ref 0–0.5)
LYMPHOCYTES # BLD AUTO: 1.53 10*3/MM3 (ref 0.7–3.1)
LYMPHOCYTES NFR BLD AUTO: 27.4 % (ref 19.6–45.3)
MCH RBC QN AUTO: 28 PG (ref 26.6–33)
MCHC RBC AUTO-ENTMCNC: 31.7 G/DL (ref 31.5–35.7)
MCV RBC AUTO: 88.4 FL (ref 79–97)
MONOCYTES # BLD AUTO: 0.39 10*3/MM3 (ref 0.1–0.9)
MONOCYTES NFR BLD AUTO: 7 % (ref 5–12)
NEUTROPHILS NFR BLD AUTO: 3.46 10*3/MM3 (ref 1.7–7)
NEUTROPHILS NFR BLD AUTO: 62 % (ref 42.7–76)
NRBC BLD AUTO-RTO: 0 /100 WBC (ref 0–0.2)
PLATELET # BLD AUTO: 253 10*3/MM3 (ref 140–450)
PMV BLD AUTO: 11.6 FL (ref 6–12)
RBC # BLD AUTO: 5.43 10*6/MM3 (ref 3.77–5.28)
WBC NRBC COR # BLD AUTO: 5.58 10*3/MM3 (ref 3.4–10.8)

## 2025-08-26 PROCEDURE — 84443 ASSAY THYROID STIM HORMONE: CPT | Performed by: NURSE PRACTITIONER

## 2025-08-26 PROCEDURE — 80061 LIPID PANEL: CPT | Performed by: NURSE PRACTITIONER

## 2025-08-26 PROCEDURE — 80053 COMPREHEN METABOLIC PANEL: CPT | Performed by: NURSE PRACTITIONER

## 2025-08-26 PROCEDURE — 85025 COMPLETE CBC W/AUTO DIFF WBC: CPT | Performed by: NURSE PRACTITIONER

## 2025-08-26 PROCEDURE — 99396 PREV VISIT EST AGE 40-64: CPT | Performed by: NURSE PRACTITIONER

## 2025-08-26 RX ORDER — TRETINOIN 0.5 MG/G
CREAM TOPICAL
COMMUNITY
Start: 2025-05-08

## 2025-08-26 RX ORDER — FLUCONAZOLE 150 MG/1
TABLET ORAL
Qty: 2 TABLET | Refills: 0 | Status: SHIPPED | OUTPATIENT
Start: 2025-08-26

## 2025-08-27 LAB
ALBUMIN SERPL-MCNC: 4.5 G/DL (ref 3.5–5.2)
ALBUMIN/GLOB SERPL: 1.5 G/DL
ALP SERPL-CCNC: 46 U/L (ref 39–117)
ALT SERPL W P-5'-P-CCNC: 15 U/L (ref 1–33)
ANION GAP SERPL CALCULATED.3IONS-SCNC: 12 MMOL/L (ref 5–15)
AST SERPL-CCNC: 20 U/L (ref 1–32)
BILIRUB SERPL-MCNC: 0.6 MG/DL (ref 0–1.2)
BUN SERPL-MCNC: 10 MG/DL (ref 8–23)
BUN/CREAT SERPL: 14.9 (ref 7–25)
CALCIUM SPEC-SCNC: 10.4 MG/DL (ref 8.6–10.5)
CHLORIDE SERPL-SCNC: 101 MMOL/L (ref 98–107)
CHOLEST SERPL-MCNC: 186 MG/DL (ref 0–200)
CO2 SERPL-SCNC: 27 MMOL/L (ref 22–29)
CREAT SERPL-MCNC: 0.67 MG/DL (ref 0.57–1)
EGFRCR SERPLBLD CKD-EPI 2021: 98.4 ML/MIN/1.73
GLOBULIN UR ELPH-MCNC: 3.1 GM/DL
GLUCOSE SERPL-MCNC: 92 MG/DL (ref 65–99)
HDLC SERPL-MCNC: 48 MG/DL (ref 40–60)
LDLC SERPL CALC-MCNC: 113 MG/DL (ref 0–100)
LDLC/HDLC SERPL: 2.28 {RATIO}
POTASSIUM SERPL-SCNC: 4.7 MMOL/L (ref 3.5–5.2)
PROT SERPL-MCNC: 7.6 G/DL (ref 6–8.5)
SODIUM SERPL-SCNC: 140 MMOL/L (ref 136–145)
TRIGL SERPL-MCNC: 144 MG/DL (ref 0–150)
TSH SERPL DL<=0.05 MIU/L-ACNC: 1.49 UIU/ML (ref 0.27–4.2)
VLDLC SERPL-MCNC: 25 MG/DL (ref 5–40)